# Patient Record
Sex: MALE | Race: WHITE | Employment: OTHER | URBAN - METROPOLITAN AREA
[De-identification: names, ages, dates, MRNs, and addresses within clinical notes are randomized per-mention and may not be internally consistent; named-entity substitution may affect disease eponyms.]

---

## 2023-09-02 ENCOUNTER — HOSPITAL ENCOUNTER (EMERGENCY)
Age: 68
Discharge: HOME | End: 2023-09-02
Payer: MEDICARE

## 2023-09-02 VITALS
TEMPERATURE: 98.2 F | OXYGEN SATURATION: 96 % | HEART RATE: 85 BPM | SYSTOLIC BLOOD PRESSURE: 132 MMHG | DIASTOLIC BLOOD PRESSURE: 88 MMHG | WEIGHT: 213 LBS | HEIGHT: 69 IN | BODY MASS INDEX: 31.55 KG/M2 | RESPIRATION RATE: 18 BRPM

## 2023-09-02 DIAGNOSIS — R05.1 ACUTE COUGH: Primary | ICD-10-CM

## 2023-09-02 PROCEDURE — 99211 OFF/OP EST MAY X REQ PHY/QHP: CPT

## 2023-09-02 RX ORDER — METHYLPREDNISOLONE 4 MG/1
TABLET ORAL
Qty: 1 KIT | Refills: 0 | Status: SHIPPED | OUTPATIENT
Start: 2023-09-02 | End: 2023-09-08

## 2023-09-02 RX ORDER — AMOXICILLIN 500 MG/1
500 CAPSULE ORAL 3 TIMES DAILY
Qty: 21 CAPSULE | Refills: 0 | Status: SHIPPED | OUTPATIENT
Start: 2023-09-02 | End: 2023-09-09

## 2023-09-02 RX ORDER — BROMPHENIRAMINE MALEATE, PSEUDOEPHEDRINE HYDROCHLORIDE, AND DEXTROMETHORPHAN HYDROBROMIDE 2; 30; 10 MG/5ML; MG/5ML; MG/5ML
5 SYRUP ORAL 4 TIMES DAILY PRN
Qty: 120 ML | Refills: 0 | Status: SHIPPED | OUTPATIENT
Start: 2023-09-02 | End: 2023-09-07

## 2023-09-02 ASSESSMENT — PAIN SCALES - GENERAL: PAINLEVEL_OUTOF10: 0

## 2023-09-02 ASSESSMENT — PAIN - FUNCTIONAL ASSESSMENT: PAIN_FUNCTIONAL_ASSESSMENT: 0-10

## 2023-10-15 ENCOUNTER — HOSPITAL ENCOUNTER (EMERGENCY)
Age: 68
Discharge: HOME | End: 2023-10-15
Attending: STUDENT IN AN ORGANIZED HEALTH CARE EDUCATION/TRAINING PROGRAM
Payer: MEDICARE

## 2023-10-15 ENCOUNTER — APPOINTMENT (OUTPATIENT)
Dept: GENERAL RADIOLOGY | Age: 68
End: 2023-10-15
Payer: MEDICARE

## 2023-10-15 VITALS
WEIGHT: 210 LBS | RESPIRATION RATE: 18 BRPM | BODY MASS INDEX: 31.01 KG/M2 | SYSTOLIC BLOOD PRESSURE: 140 MMHG | HEART RATE: 67 BPM | TEMPERATURE: 98.1 F | OXYGEN SATURATION: 96 % | DIASTOLIC BLOOD PRESSURE: 75 MMHG

## 2023-10-15 DIAGNOSIS — R10.84 GENERALIZED ABDOMINAL PAIN: ICD-10-CM

## 2023-10-15 DIAGNOSIS — B02.9 HERPES ZOSTER WITHOUT COMPLICATION: Primary | ICD-10-CM

## 2023-10-15 LAB
ALBUMIN SERPL-MCNC: 4.8 G/DL (ref 3.5–5.2)
ALP SERPL-CCNC: 64 U/L (ref 40–129)
ALT SERPL-CCNC: 18 U/L (ref 0–40)
ANION GAP SERPL CALCULATED.3IONS-SCNC: 11 MMOL/L (ref 7–16)
AST SERPL-CCNC: 16 U/L (ref 0–39)
BASOPHILS # BLD: 0.07 K/UL (ref 0–0.2)
BASOPHILS NFR BLD: 1 % (ref 0–2)
BILIRUB SERPL-MCNC: 1 MG/DL (ref 0–1.2)
BUN SERPL-MCNC: 19 MG/DL (ref 6–23)
CALCIUM SERPL-MCNC: 9.9 MG/DL (ref 8.6–10.2)
CHLORIDE SERPL-SCNC: 102 MMOL/L (ref 98–107)
CO2 SERPL-SCNC: 26 MMOL/L (ref 22–29)
CREAT SERPL-MCNC: 1.1 MG/DL (ref 0.7–1.2)
EOSINOPHIL # BLD: 0.24 K/UL (ref 0.05–0.5)
EOSINOPHILS RELATIVE PERCENT: 3 % (ref 0–6)
ERYTHROCYTE [DISTWIDTH] IN BLOOD BY AUTOMATED COUNT: 13.2 % (ref 11.5–15)
GFR SERPL CREATININE-BSD FRML MDRD: >60 ML/MIN/1.73M2
GLUCOSE SERPL-MCNC: 138 MG/DL (ref 74–99)
HCT VFR BLD AUTO: 47.3 % (ref 37–54)
HGB BLD-MCNC: 15.5 G/DL (ref 12.5–16.5)
IMM GRANULOCYTES # BLD AUTO: 0.05 K/UL (ref 0–0.58)
IMM GRANULOCYTES NFR BLD: 1 % (ref 0–5)
LACTATE BLDV-SCNC: 1.4 MMOL/L (ref 0.5–2.2)
LACTATE BLDV-SCNC: 2.4 MMOL/L (ref 0.5–2.2)
LIPASE SERPL-CCNC: 39 U/L (ref 13–60)
LYMPHOCYTES NFR BLD: 2.11 K/UL (ref 1.5–4)
LYMPHOCYTES RELATIVE PERCENT: 28 % (ref 20–42)
MCH RBC QN AUTO: 28.4 PG (ref 26–35)
MCHC RBC AUTO-ENTMCNC: 32.8 G/DL (ref 32–34.5)
MCV RBC AUTO: 86.6 FL (ref 80–99.9)
MONOCYTES NFR BLD: 0.66 K/UL (ref 0.1–0.95)
MONOCYTES NFR BLD: 9 % (ref 2–12)
NEUTROPHILS NFR BLD: 59 % (ref 43–80)
NEUTS SEG NFR BLD: 4.47 K/UL (ref 1.8–7.3)
PLATELET # BLD AUTO: 195 K/UL (ref 130–450)
PMV BLD AUTO: 10.1 FL (ref 7–12)
POTASSIUM SERPL-SCNC: 4.8 MMOL/L (ref 3.5–5)
PROT SERPL-MCNC: 7.9 G/DL (ref 6.4–8.3)
RBC # BLD AUTO: 5.46 M/UL (ref 3.8–5.8)
SODIUM SERPL-SCNC: 139 MMOL/L (ref 132–146)
WBC OTHER # BLD: 7.6 K/UL (ref 4.5–11.5)

## 2023-10-15 PROCEDURE — 6360000004 HC RX CONTRAST MEDICATION: Performed by: RADIOLOGY

## 2023-10-15 PROCEDURE — 96361 HYDRATE IV INFUSION ADD-ON: CPT

## 2023-10-15 PROCEDURE — 74177 CT ABD & PELVIS W/CONTRAST: CPT

## 2023-10-15 PROCEDURE — 96374 THER/PROPH/DIAG INJ IV PUSH: CPT

## 2023-10-15 PROCEDURE — 6360000002 HC RX W HCPCS: Performed by: STUDENT IN AN ORGANIZED HEALTH CARE EDUCATION/TRAINING PROGRAM

## 2023-10-15 PROCEDURE — 99285 EMERGENCY DEPT VISIT HI MDM: CPT

## 2023-10-15 PROCEDURE — 2580000003 HC RX 258: Performed by: STUDENT IN AN ORGANIZED HEALTH CARE EDUCATION/TRAINING PROGRAM

## 2023-10-15 PROCEDURE — 85025 COMPLETE CBC W/AUTO DIFF WBC: CPT

## 2023-10-15 PROCEDURE — 2500000003 HC RX 250 WO HCPCS: Performed by: STUDENT IN AN ORGANIZED HEALTH CARE EDUCATION/TRAINING PROGRAM

## 2023-10-15 PROCEDURE — 80053 COMPREHEN METABOLIC PANEL: CPT

## 2023-10-15 PROCEDURE — 71045 X-RAY EXAM CHEST 1 VIEW: CPT

## 2023-10-15 PROCEDURE — 83690 ASSAY OF LIPASE: CPT

## 2023-10-15 PROCEDURE — 83605 ASSAY OF LACTIC ACID: CPT

## 2023-10-15 PROCEDURE — 93005 ELECTROCARDIOGRAM TRACING: CPT | Performed by: STUDENT IN AN ORGANIZED HEALTH CARE EDUCATION/TRAINING PROGRAM

## 2023-10-15 PROCEDURE — 96375 TX/PRO/DX INJ NEW DRUG ADDON: CPT

## 2023-10-15 PROCEDURE — A4216 STERILE WATER/SALINE, 10 ML: HCPCS | Performed by: STUDENT IN AN ORGANIZED HEALTH CARE EDUCATION/TRAINING PROGRAM

## 2023-10-15 RX ORDER — MORPHINE SULFATE 2 MG/ML
2 INJECTION, SOLUTION INTRAMUSCULAR; INTRAVENOUS ONCE
Status: COMPLETED | OUTPATIENT
Start: 2023-10-15 | End: 2023-10-15

## 2023-10-15 RX ORDER — ONDANSETRON 2 MG/ML
4 INJECTION INTRAMUSCULAR; INTRAVENOUS ONCE
Status: COMPLETED | OUTPATIENT
Start: 2023-10-15 | End: 2023-10-15

## 2023-10-15 RX ORDER — ONDANSETRON 4 MG/1
4 TABLET, ORALLY DISINTEGRATING ORAL 3 TIMES DAILY PRN
Qty: 21 TABLET | Refills: 0 | Status: SHIPPED | OUTPATIENT
Start: 2023-10-15

## 2023-10-15 RX ORDER — VALACYCLOVIR HYDROCHLORIDE 1 G/1
1000 TABLET, FILM COATED ORAL 3 TIMES DAILY
Qty: 21 TABLET | Refills: 0 | Status: SHIPPED | OUTPATIENT
Start: 2023-10-15 | End: 2023-10-22

## 2023-10-15 RX ORDER — OXYCODONE HYDROCHLORIDE AND ACETAMINOPHEN 5; 325 MG/1; MG/1
1 TABLET ORAL EVERY 6 HOURS PRN
Qty: 12 TABLET | Refills: 0 | Status: SHIPPED | OUTPATIENT
Start: 2023-10-15 | End: 2023-10-18

## 2023-10-15 RX ORDER — PANTOPRAZOLE SODIUM 40 MG/1
40 TABLET, DELAYED RELEASE ORAL
Qty: 30 TABLET | Refills: 0 | Status: SHIPPED | OUTPATIENT
Start: 2023-10-15

## 2023-10-15 RX ORDER — ACETAMINOPHEN 325 MG/1
650 TABLET ORAL ONCE
Status: DISCONTINUED | OUTPATIENT
Start: 2023-10-15 | End: 2023-10-15 | Stop reason: HOSPADM

## 2023-10-15 RX ORDER — 0.9 % SODIUM CHLORIDE 0.9 %
1000 INTRAVENOUS SOLUTION INTRAVENOUS ONCE
Status: COMPLETED | OUTPATIENT
Start: 2023-10-15 | End: 2023-10-15

## 2023-10-15 RX ADMIN — IOPAMIDOL 75 ML: 755 INJECTION, SOLUTION INTRAVENOUS at 12:14

## 2023-10-15 RX ADMIN — MORPHINE SULFATE 2 MG: 2 INJECTION, SOLUTION INTRAMUSCULAR; INTRAVENOUS at 11:31

## 2023-10-15 RX ADMIN — FAMOTIDINE 20 MG: 10 INJECTION, SOLUTION INTRAVENOUS at 10:37

## 2023-10-15 RX ADMIN — SODIUM CHLORIDE 1000 ML: 9 INJECTION, SOLUTION INTRAVENOUS at 10:37

## 2023-10-15 RX ADMIN — ONDANSETRON 4 MG: 2 INJECTION INTRAMUSCULAR; INTRAVENOUS at 10:43

## 2023-10-15 ASSESSMENT — PAIN DESCRIPTION - FREQUENCY: FREQUENCY: CONTINUOUS

## 2023-10-15 ASSESSMENT — PAIN SCALES - GENERAL
PAINLEVEL_OUTOF10: 10
PAINLEVEL_OUTOF10: 8

## 2023-10-15 ASSESSMENT — LIFESTYLE VARIABLES
HOW OFTEN DO YOU HAVE A DRINK CONTAINING ALCOHOL: NEVER
HOW MANY STANDARD DRINKS CONTAINING ALCOHOL DO YOU HAVE ON A TYPICAL DAY: PATIENT DOES NOT DRINK

## 2023-10-15 ASSESSMENT — PAIN DESCRIPTION - PAIN TYPE: TYPE: ACUTE PAIN

## 2023-10-15 ASSESSMENT — PAIN DESCRIPTION - LOCATION
LOCATION: ABDOMEN
LOCATION: ABDOMEN

## 2023-10-15 ASSESSMENT — PAIN DESCRIPTION - ORIENTATION
ORIENTATION: LEFT;UPPER
ORIENTATION: LEFT;UPPER

## 2023-10-15 ASSESSMENT — PAIN DESCRIPTION - DESCRIPTORS: DESCRIPTORS: ACHING;DISCOMFORT;SORE;PRESSURE

## 2023-10-15 ASSESSMENT — PAIN DESCRIPTION - ONSET: ONSET: ON-GOING

## 2023-10-15 ASSESSMENT — PAIN - FUNCTIONAL ASSESSMENT: PAIN_FUNCTIONAL_ASSESSMENT: 0-10

## 2023-10-15 NOTE — ED PROVIDER NOTES
as well as left-sided chest wall pain. Vital signs stable presentation. Physical exam heart regular rate and rhythm lungs clear to auscultation bilaterally, abdomen soft with mild epigastric left upper quadrant abdominal tenderness. On examination left chest wall there is a early vesicular rash noted. Differential diagnosis includes gastritis, gastroenteritis, herpes zoster. EKG obtained demonstrate no acute ischemic changes. Laboratory work obtained CBC unremarkable, CMP unremarkable, lipase 39, lactic acid 1.4. Chest x-ray obtained demonstrate no acute abnormalities. CT scan of the abdomen pelvis demonstrated mild evidence of constipation as well as fat-containing inguinal hernias. Given 1 L of IV fluids, 2 mg of IV morphine, 4 mg of IV Zofran as well as 20 mg of IV Pepcid. Repeat evaluation patient does note some improvement in pain. Findings consistent with left-sided chest pain likely secondary to herpes zoster. Decision made to discharge patient. Patient given prescription for Zofran, Percocet, Protonix as well as Valtrex. Patient instructed to take all medication as prescribed. Strict return precautions discussed all questions answered patient agreement plan of care discharged home in stable condition. Disposition Considerations (Tests not ordered but considered, Shared Decision Making, Pt Expectation of Test or Tx.): Shared decision making discussed with patient. Laboratory work and imaging is reassuring. Findings concerning for herpes zoster infection. Patient does have some improvement in pain after treatment emergency department. Decision made to discharge patient. Patient given prescription for Valtrex as well as Percocet Zofran and Protonix. Strict return precautions discussed all questions answered patient agreement plan of care discharged home in stable condition. FINAL IMPRESSION      1. Herpes zoster without complication    2.  Generalized abdominal pain

## 2023-10-16 LAB
EKG ATRIAL RATE: 78 BPM
EKG P AXIS: 65 DEGREES
EKG P-R INTERVAL: 174 MS
EKG Q-T INTERVAL: 388 MS
EKG QRS DURATION: 112 MS
EKG QTC CALCULATION (BAZETT): 442 MS
EKG R AXIS: -8 DEGREES
EKG T AXIS: 18 DEGREES
EKG VENTRICULAR RATE: 78 BPM

## 2023-10-16 PROCEDURE — 93010 ELECTROCARDIOGRAM REPORT: CPT | Performed by: INTERNAL MEDICINE

## 2024-12-15 ENCOUNTER — HOSPITAL ENCOUNTER (EMERGENCY)
Facility: HOSPITAL | Age: 69
Discharge: HOME | End: 2024-12-15
Payer: MEDICARE

## 2024-12-15 VITALS
WEIGHT: 215 LBS | OXYGEN SATURATION: 96 % | BODY MASS INDEX: 31.84 KG/M2 | RESPIRATION RATE: 18 BRPM | HEART RATE: 92 BPM | SYSTOLIC BLOOD PRESSURE: 163 MMHG | HEIGHT: 69 IN | TEMPERATURE: 97.2 F | DIASTOLIC BLOOD PRESSURE: 95 MMHG

## 2024-12-15 DIAGNOSIS — M54.50 CHRONIC LOW BACK PAIN WITHOUT SCIATICA, UNSPECIFIED BACK PAIN LATERALITY: Primary | ICD-10-CM

## 2024-12-15 DIAGNOSIS — G89.29 CHRONIC LOW BACK PAIN WITHOUT SCIATICA, UNSPECIFIED BACK PAIN LATERALITY: Primary | ICD-10-CM

## 2024-12-15 PROCEDURE — 2500000004 HC RX 250 GENERAL PHARMACY W/ HCPCS (ALT 636 FOR OP/ED)

## 2024-12-15 PROCEDURE — 96372 THER/PROPH/DIAG INJ SC/IM: CPT

## 2024-12-15 PROCEDURE — 99284 EMERGENCY DEPT VISIT MOD MDM: CPT

## 2024-12-15 RX ORDER — TRAMADOL HYDROCHLORIDE 50 MG/1
50 TABLET ORAL EVERY 6 HOURS PRN
Qty: 10 TABLET | Refills: 0 | Status: SHIPPED | OUTPATIENT
Start: 2024-12-15 | End: 2024-12-18

## 2024-12-15 RX ORDER — OXYCODONE AND ACETAMINOPHEN 5; 325 MG/1; MG/1
1 TABLET ORAL 2 TIMES DAILY
Qty: 6 TABLET | Refills: 0 | Status: SHIPPED | OUTPATIENT
Start: 2024-12-15 | End: 2024-12-18

## 2024-12-15 RX ORDER — METHYLPREDNISOLONE 4 MG/1
TABLET ORAL
Qty: 21 TABLET | Refills: 0 | Status: SHIPPED | OUTPATIENT
Start: 2024-12-15 | End: 2024-12-21

## 2024-12-15 RX ADMIN — METHYLPREDNISOLONE SODIUM SUCCINATE 125 MG: 125 INJECTION, POWDER, FOR SOLUTION INTRAMUSCULAR; INTRAVENOUS at 09:45

## 2024-12-15 ASSESSMENT — PAIN DESCRIPTION - ORIENTATION: ORIENTATION: LOWER

## 2024-12-15 ASSESSMENT — PAIN DESCRIPTION - PAIN TYPE: TYPE: ACUTE PAIN

## 2024-12-15 ASSESSMENT — COLUMBIA-SUICIDE SEVERITY RATING SCALE - C-SSRS
6. HAVE YOU EVER DONE ANYTHING, STARTED TO DO ANYTHING, OR PREPARED TO DO ANYTHING TO END YOUR LIFE?: NO
1. IN THE PAST MONTH, HAVE YOU WISHED YOU WERE DEAD OR WISHED YOU COULD GO TO SLEEP AND NOT WAKE UP?: NO
2. HAVE YOU ACTUALLY HAD ANY THOUGHTS OF KILLING YOURSELF?: NO

## 2024-12-15 ASSESSMENT — PAIN - FUNCTIONAL ASSESSMENT: PAIN_FUNCTIONAL_ASSESSMENT: 0-10

## 2024-12-15 ASSESSMENT — PAIN SCALES - GENERAL: PAINLEVEL_OUTOF10: 8

## 2024-12-15 ASSESSMENT — PAIN DESCRIPTION - DESCRIPTORS: DESCRIPTORS: ACHING

## 2024-12-15 ASSESSMENT — PAIN DESCRIPTION - LOCATION: LOCATION: BACK

## 2024-12-15 NOTE — ED TRIAGE NOTES
Patient to ED c/o lower back pain rated 8/10. Pt states no known injury, and that this usually occurs once or twice a year. Patient is ambulatory to department. vss

## 2024-12-15 NOTE — ED PROVIDER NOTES
HPI   Chief Complaint   Patient presents with    Back Pain     Lower back pain, chronic pt states happens about 1-2 times per year       Patient presents with chief complaint of low back pain.  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  Patient just moved here approximately 3 weeks ago and does not have a primary care physician.  The patient does admit to a chronic issue of low back pain.    ROS:    CONSTITUTIONAL: Denies weight loss, fever and chills    HEENT: Denies changes in vision and hearing, No sore throat    RESPIRATORY: Denies SOB and cough    CV: Denies palpitations or chest pain    GI: Denies abdominal pain, nausea, vomiting and diarrhea    : Denies dysuria and urinary frequency    MUSCULOSKELETAL: Low back pain    SKIN: Denies rash and pruritus    NEUROLOGICAL: Denies headache and syncope    PSYCHIATRIC: Denies recent changes in mood. Denies anxiety and depression      PHYSICAL EXAM:    GENERAL: Alert and oriented x 3. No acute distress. Well-nourished    EYES: EOMI. Anicteric    HEENT: Moist mucous membranes. No scleral icterus. No cervical lymphadenopathy    LUNGS: Clear to auscultation bilaterally. No accessory muscle use    CARDIOVASCULAR: Regular rate and rhythm. No murmur. No JVD    ABDOMEN: Soft, non-tender and non-distended. No palpable masses    EXTREMITIES: No edema. Non-tender    SKIN: No rashes or lesions    NEUROLOGIC: No focal neurological deficits. CN II-XII grossly intact    PSYCHIATRIC: Cooperative. Appropriate mood and affect      Medical Decision Making:    Differential Diagnosis: Chronic low back pain    Clinical Laboratory Review:    Imaging Review:    Discussion with Consulting Physician:    Treatment Plan: Patient to receive an injection of Solu-Medrol and discharged to home with medication    Follow Up:  Follow up with your primary care physician as soon as possible    Return Precautions:  Return to the emergency department if symptoms  worsen at any time              Patient History   History reviewed. No pertinent past medical history.  History reviewed. No pertinent surgical history.  No family history on file.  Social History     Tobacco Use    Smoking status: Unknown    Smokeless tobacco: Not on file   Substance Use Topics    Alcohol use: Not on file    Drug use: Not on file       Physical Exam   ED Triage Vitals [12/15/24 0916]   Temperature Heart Rate Respirations BP   36.2 °C (97.2 °F) 92 18 (!) 163/95      SpO2 Temp Source Heart Rate Source Patient Position   96 % Temporal Monitor --      BP Location FiO2 (%)     -- --       Physical Exam      ED Course & MDM   Diagnoses as of 12/15/24 0941   Chronic low back pain without sciatica, unspecified back pain laterality                 No data recorded                                 Medical Decision Making      Procedure  Procedures    Diagnoses as of 12/15/24 0943   Chronic low back pain without sciatica, unspecified back pain laterality          Shae Davalos,   12/15/24 0943

## 2025-01-04 ENCOUNTER — HOSPITAL ENCOUNTER (EMERGENCY)
Facility: HOSPITAL | Age: 70
Discharge: HOME | End: 2025-01-04
Attending: EMERGENCY MEDICINE
Payer: MEDICARE

## 2025-01-04 ENCOUNTER — APPOINTMENT (OUTPATIENT)
Dept: RADIOLOGY | Facility: HOSPITAL | Age: 70
End: 2025-01-04
Payer: MEDICARE

## 2025-01-04 VITALS
HEART RATE: 81 BPM | DIASTOLIC BLOOD PRESSURE: 78 MMHG | OXYGEN SATURATION: 97 % | HEIGHT: 69 IN | TEMPERATURE: 98.2 F | RESPIRATION RATE: 19 BRPM | SYSTOLIC BLOOD PRESSURE: 132 MMHG | BODY MASS INDEX: 29.62 KG/M2 | WEIGHT: 200 LBS

## 2025-01-04 DIAGNOSIS — G89.29 CHRONIC LOW BACK PAIN WITHOUT SCIATICA, UNSPECIFIED BACK PAIN LATERALITY: Primary | ICD-10-CM

## 2025-01-04 DIAGNOSIS — M54.50 CHRONIC LOW BACK PAIN WITHOUT SCIATICA, UNSPECIFIED BACK PAIN LATERALITY: Primary | ICD-10-CM

## 2025-01-04 PROCEDURE — 72072 X-RAY EXAM THORAC SPINE 3VWS: CPT

## 2025-01-04 PROCEDURE — 2500000004 HC RX 250 GENERAL PHARMACY W/ HCPCS (ALT 636 FOR OP/ED)

## 2025-01-04 PROCEDURE — 72110 X-RAY EXAM L-2 SPINE 4/>VWS: CPT | Performed by: RADIOLOGY

## 2025-01-04 PROCEDURE — 99284 EMERGENCY DEPT VISIT MOD MDM: CPT | Performed by: EMERGENCY MEDICINE

## 2025-01-04 PROCEDURE — 72072 X-RAY EXAM THORAC SPINE 3VWS: CPT | Performed by: RADIOLOGY

## 2025-01-04 PROCEDURE — 96372 THER/PROPH/DIAG INJ SC/IM: CPT

## 2025-01-04 PROCEDURE — 72110 X-RAY EXAM L-2 SPINE 4/>VWS: CPT

## 2025-01-04 RX ORDER — MORPHINE SULFATE 2 MG/ML
INJECTION, SOLUTION INTRAMUSCULAR; INTRAVENOUS
Status: COMPLETED
Start: 2025-01-04 | End: 2025-01-04

## 2025-01-04 RX ORDER — HYDROCODONE BITARTRATE AND ACETAMINOPHEN 5; 325 MG/1; MG/1
1 TABLET ORAL EVERY 6 HOURS PRN
Qty: 20 TABLET | Refills: 0 | Status: SHIPPED | OUTPATIENT
Start: 2025-01-04 | End: 2025-01-07

## 2025-01-04 RX ORDER — SEMAGLUTIDE 0.68 MG/ML
0.5 INJECTION, SOLUTION SUBCUTANEOUS WEEKLY
COMMUNITY
Start: 2024-12-31

## 2025-01-04 RX ORDER — MORPHINE SULFATE 2 MG/ML
2 INJECTION, SOLUTION INTRAMUSCULAR; INTRAVENOUS ONCE
Status: COMPLETED | OUTPATIENT
Start: 2025-01-04 | End: 2025-01-04

## 2025-01-04 RX ORDER — LOSARTAN POTASSIUM 100 MG/1
100 TABLET ORAL
COMMUNITY
Start: 2024-12-12

## 2025-01-04 RX ORDER — TRAMADOL HYDROCHLORIDE 50 MG/1
50 TABLET ORAL EVERY 6 HOURS PRN
Qty: 10 TABLET | Refills: 0 | Status: SHIPPED | OUTPATIENT
Start: 2025-01-04 | End: 2025-01-07

## 2025-01-04 RX ADMIN — MORPHINE SULFATE 2 MG: 2 INJECTION, SOLUTION INTRAMUSCULAR; INTRAVENOUS at 13:05

## 2025-01-04 ASSESSMENT — COLUMBIA-SUICIDE SEVERITY RATING SCALE - C-SSRS
2. HAVE YOU ACTUALLY HAD ANY THOUGHTS OF KILLING YOURSELF?: NO
6. HAVE YOU EVER DONE ANYTHING, STARTED TO DO ANYTHING, OR PREPARED TO DO ANYTHING TO END YOUR LIFE?: NO
1. IN THE PAST MONTH, HAVE YOU WISHED YOU WERE DEAD OR WISHED YOU COULD GO TO SLEEP AND NOT WAKE UP?: NO

## 2025-01-04 ASSESSMENT — PAIN SCALES - GENERAL
PAINLEVEL_OUTOF10: 6
PAINLEVEL_OUTOF10: 3

## 2025-01-04 ASSESSMENT — PAIN - FUNCTIONAL ASSESSMENT
PAIN_FUNCTIONAL_ASSESSMENT: 0-10
PAIN_FUNCTIONAL_ASSESSMENT: 0-10

## 2025-01-04 NOTE — ED TRIAGE NOTES
Pt arrived with back pain in middle of back. States was there a month ago now has returned. Denies nay other symptoms

## 2025-01-04 NOTE — ED PROVIDER NOTES
HPI   Chief Complaint   Patient presents with    Back Pain     Middle of back, was there a month ago       Patient presents with a chief complaint of mid to lower back pain.  Patient does have a history of chronic back pain.  He denies any recent trauma.  He was seen several weeks ago in the emergency department for back pain.  He states he just moved here and does not have a family physician.    ROS:    CONSTITUTIONAL: Denies weight loss, fever and chills    HEENT: Denies changes in vision and hearing, No sore throat    RESPIRATORY: Denies SOB and cough    CV: Denies palpitations or chest pain    GI: Denies abdominal pain, nausea, vomiting and diarrhea    : Denies dysuria and urinary frequency    MUSCULOSKELETAL: Mid to lower back pain    SKIN: Denies rash and pruritus    NEUROLOGICAL: Denies headache and syncope    PSYCHIATRIC: Denies recent changes in mood. Denies anxiety and depression      PHYSICAL EXAM:    GENERAL: Alert and oriented x 3. No acute distress. Well-nourished    EYES: EOMI. Anicteric    HEENT: Moist mucous membranes. No scleral icterus. No cervical lymphadenopathy    LUNGS: Clear to auscultation bilaterally. No accessory muscle use    CARDIOVASCULAR: Regular rate and rhythm. No murmur. No JVD    ABDOMEN: Soft, non-tender and non-distended. No palpable masses    EXTREMITIES: No edema. Non-tender    SKIN: No rashes or lesions    NEUROLOGIC: No focal neurological deficits. CN II-XII grossly intact    PSYCHIATRIC: Cooperative. Appropriate mood and affect        Medical Decision Making:     Differential Diagnosis: Arthritis, compression fracture    Clinical Laboratory Review:    Imaging Review: Reviewed x-rays which reveal mild degenerative disease    Discussion with Consulting Physician:    Treatment Plan: Discussed with patient diagnosis of arthritis.  Patient needs to find a primary care physician as soon as possible    Follow Up:  Follow up with your primary care physician as soon as  possible    Return Precautions:  Return to the emergency department if symptoms worsen at any time                      Patient History   Past Medical History:   Diagnosis Date    Diabetes mellitus (Multi)     Hypertension      Past Surgical History:   Procedure Laterality Date    HERNIA REPAIR      SHOULDER Left      No family history on file.  Social History     Tobacco Use    Smoking status: Former     Types: Cigarettes    Smokeless tobacco: Never   Substance Use Topics    Alcohol use: Not Currently    Drug use: Never       Physical Exam   ED Triage Vitals [01/04/25 1250]   Temperature Heart Rate Respirations BP   36.5 °C (97.7 °F) 85 18 133/90      SpO2 Temp Source Heart Rate Source Patient Position   97 % Tympanic -- --      BP Location FiO2 (%)     -- --       Physical Exam      ED Course & MDM   Diagnoses as of 01/04/25 1350   Chronic low back pain without sciatica, unspecified back pain laterality                 No data recorded     [place] Coma Scale Score: 15 (01/04/25 1243 : Shirley Dominguez RN)                           Medical Decision Making      Procedure  Procedures    Diagnoses as of 01/04/25 1351   Chronic low back pain without sciatica, unspecified back pain laterality          Shae Davalos DO  01/04/25 1351

## 2025-03-16 ENCOUNTER — HOSPITAL ENCOUNTER (EMERGENCY)
Facility: HOSPITAL | Age: 70
Discharge: HOME | End: 2025-03-16
Attending: EMERGENCY MEDICINE
Payer: MEDICARE

## 2025-03-16 ENCOUNTER — APPOINTMENT (OUTPATIENT)
Dept: RADIOLOGY | Facility: HOSPITAL | Age: 70
End: 2025-03-16
Payer: MEDICARE

## 2025-03-16 ENCOUNTER — APPOINTMENT (OUTPATIENT)
Dept: CARDIOLOGY | Facility: HOSPITAL | Age: 70
End: 2025-03-16
Payer: MEDICARE

## 2025-03-16 VITALS
DIASTOLIC BLOOD PRESSURE: 88 MMHG | SYSTOLIC BLOOD PRESSURE: 162 MMHG | BODY MASS INDEX: 29.62 KG/M2 | HEART RATE: 97 BPM | RESPIRATION RATE: 18 BRPM | WEIGHT: 200 LBS | OXYGEN SATURATION: 95 % | TEMPERATURE: 98.7 F | HEIGHT: 69 IN

## 2025-03-16 DIAGNOSIS — M54.12 CERVICAL RADICULOPATHY: Primary | ICD-10-CM

## 2025-03-16 DIAGNOSIS — M62.830 SPASM OF LEFT TRAPEZIUS MUSCLE: ICD-10-CM

## 2025-03-16 PROCEDURE — 93005 ELECTROCARDIOGRAM TRACING: CPT

## 2025-03-16 PROCEDURE — 2500000004 HC RX 250 GENERAL PHARMACY W/ HCPCS (ALT 636 FOR OP/ED): Performed by: EMERGENCY MEDICINE

## 2025-03-16 PROCEDURE — 70450 CT HEAD/BRAIN W/O DYE: CPT | Performed by: STUDENT IN AN ORGANIZED HEALTH CARE EDUCATION/TRAINING PROGRAM

## 2025-03-16 PROCEDURE — 72125 CT NECK SPINE W/O DYE: CPT | Performed by: STUDENT IN AN ORGANIZED HEALTH CARE EDUCATION/TRAINING PROGRAM

## 2025-03-16 PROCEDURE — 72125 CT NECK SPINE W/O DYE: CPT

## 2025-03-16 PROCEDURE — 96372 THER/PROPH/DIAG INJ SC/IM: CPT

## 2025-03-16 PROCEDURE — 2500000004 HC RX 250 GENERAL PHARMACY W/ HCPCS (ALT 636 FOR OP/ED)

## 2025-03-16 PROCEDURE — 70450 CT HEAD/BRAIN W/O DYE: CPT

## 2025-03-16 PROCEDURE — 96372 THER/PROPH/DIAG INJ SC/IM: CPT | Performed by: EMERGENCY MEDICINE

## 2025-03-16 PROCEDURE — 99285 EMERGENCY DEPT VISIT HI MDM: CPT | Mod: 25 | Performed by: EMERGENCY MEDICINE

## 2025-03-16 PROCEDURE — 73030 X-RAY EXAM OF SHOULDER: CPT | Mod: LT

## 2025-03-16 PROCEDURE — 73030 X-RAY EXAM OF SHOULDER: CPT | Mod: LEFT SIDE | Performed by: RADIOLOGY

## 2025-03-16 RX ORDER — KETOROLAC TROMETHAMINE 30 MG/ML
30 INJECTION, SOLUTION INTRAMUSCULAR; INTRAVENOUS ONCE
Status: DISCONTINUED | OUTPATIENT
Start: 2025-03-16 | End: 2025-03-16 | Stop reason: HOSPADM

## 2025-03-16 RX ORDER — MORPHINE SULFATE 4 MG/ML
4 INJECTION INTRAVENOUS ONCE
Status: DISCONTINUED | OUTPATIENT
Start: 2025-03-16 | End: 2025-03-16

## 2025-03-16 RX ORDER — MORPHINE SULFATE 4 MG/ML
4 INJECTION INTRAVENOUS ONCE
Status: COMPLETED | OUTPATIENT
Start: 2025-03-16 | End: 2025-03-16

## 2025-03-16 RX ORDER — MORPHINE SULFATE 4 MG/ML
INJECTION INTRAVENOUS
Status: COMPLETED
Start: 2025-03-16 | End: 2025-03-16

## 2025-03-16 RX ORDER — TIZANIDINE HYDROCHLORIDE 4 MG/1
4 CAPSULE, GELATIN COATED ORAL 4 TIMES DAILY PRN
Qty: 20 CAPSULE | Refills: 0 | Status: SHIPPED | OUTPATIENT
Start: 2025-03-16 | End: 2025-03-25 | Stop reason: HOSPADM

## 2025-03-16 RX ORDER — PREDNISONE 20 MG/1
40 TABLET ORAL DAILY
Qty: 10 TABLET | Refills: 0 | Status: SHIPPED | OUTPATIENT
Start: 2025-03-16 | End: 2025-03-25 | Stop reason: HOSPADM

## 2025-03-16 RX ORDER — OXYCODONE AND ACETAMINOPHEN 5; 325 MG/1; MG/1
1 TABLET ORAL EVERY 8 HOURS PRN
Qty: 9 TABLET | Refills: 0 | Status: SHIPPED | OUTPATIENT
Start: 2025-03-16 | End: 2025-03-25 | Stop reason: HOSPADM

## 2025-03-16 RX ADMIN — MORPHINE SULFATE 4 MG: 4 INJECTION, SOLUTION INTRAMUSCULAR; INTRAVENOUS at 13:54

## 2025-03-16 RX ADMIN — MORPHINE SULFATE 4 MG: 4 INJECTION INTRAVENOUS at 13:54

## 2025-03-16 ASSESSMENT — PAIN - FUNCTIONAL ASSESSMENT
PAIN_FUNCTIONAL_ASSESSMENT: 0-10
PAIN_FUNCTIONAL_ASSESSMENT: 0-10

## 2025-03-16 ASSESSMENT — PAIN DESCRIPTION - ORIENTATION: ORIENTATION: LEFT

## 2025-03-16 ASSESSMENT — PAIN SCALES - GENERAL
PAINLEVEL_OUTOF10: 3
PAINLEVEL_OUTOF10: 8

## 2025-03-16 ASSESSMENT — PAIN DESCRIPTION - DESCRIPTORS: DESCRIPTORS: ACHING

## 2025-03-16 ASSESSMENT — PAIN DESCRIPTION - LOCATION: LOCATION: SHOULDER

## 2025-03-16 NOTE — ED PROVIDER NOTES
History/Exam limitations: none.   Additional history was obtained from patient and EMS personnel.    HPI:       Mello Mathur Jr. is a 69 y.o. with a history of cochlear implant, speech impediment presents with difficulty forming thoughts, right arm paresthesia, and headache.  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  After 5 minutes of speaking as he was reading from a paper, he couldn't form his thoughts, had trouble reading the words, developed numbness of his right arm, and developed a mild diffuse headache.    He has had 3 other similar episodes in the past month.    No blood thinners.  Glucose normal for EMS       Last Known Well Time: 11:00 AM        ------------------------------------------------------------------------------------------------------------------------------------------     Physical Exam:    ED Triage Vitals [03/16/25 1301]   Temperature Heart Rate Respirations BP   37.5 °C (99.5 °F) (!) 104 16 (!) 180/93      SpO2 Temp src Heart Rate Source Patient Position   94 % -- -- --      BP Location FiO2 (%)     -- --          Gen: Not in acute distress  Head/Neck: NCAT  Eyes: Anicteric sclerae, noninjected conjunctivae  Nose: No rhinorrhea  Mouth:  MMM  Heart: Regular rate and rhythm w/ no murmurs, rubs, gallops  Lungs: CTAB w/o wheezes, rales, rhonchi, no increased work of breathing  Abdomen: Soft, NT/ND  Musculoskeletal: No deformities  Extremities: No edema.  Neurologic: Please see below for NIHSS  Skin: No rashes noted  Psychological: Calm        Interval: {nihss interval:90245}  Time: 1:21 PM  Person Administering Scale: Jefe Frances DO     1a  Level of consciousness: {consciousness neuro:39933}   1b. LOC questions:  {loc commands neuro:42626}   1c. LOC commands: {loc commands neuro:46044}   2.  Best Gaze: {best gaze neuro:39214}   3. Visual: {visual neuro:91371}   4. Facial Palsy: {facial palsy:81415}   5a. Motor left arm: {motor neuro:07455}    5b.  Motor right arm: {motor neuro:31108}   6a. motor left leg: {motor neuro:78496}   6b  Motor right leg:  {motor neuro:61299}   7. Limb Ataxia: {limb ataxia neuro:02017}   8.  Sensory: {sensory neuro:54240}   9. Best Language:  {best language neuro:51744}   10. Dysarthria: {dysarthria neuro:01654}   11. Extinction and Inattention: {extinction neuro:72365}     Total:   {0-42:45984}         VAN: {VAN:71212}     ------------------------------------------------------------------------------------------------------------------------------------------     Medical Decision Making:     Diagnoses as of 03/16/25 1321   Disorder of form of thought   Arm paresthesia, right   Acute nonintractable headache, unspecified headache type        EKG interpreted by myself: ***     Independent Interpretation of Studies: I independently interpreted the CT head and see {Stroke CT Independent Interpretation:74996}    Chronic Medical Conditions Significantly Affecting Care: ***    Social Determinants of Health Significantly Affecting Care: {Social determinants:23212}     External Records Reviewed: I reviewed recent and relevant outside records including: ***     Discussion of Management with Other Providers:   I discussed the patient/results with: *** neurology and the admitting team      IV Thrombolysis IV Thrombolysis Checklist    {Pediatric patients aged <18 years with Acute Suspected Stroke - refer to the [facility] Clinical Practice Guideline for the Emergency Management of pediatric patients with Acute Suspected Stroke & the Pediatric IV Thrombolysis Consent :99}    {IV Thrombolysis Given:41832}  {For EVT therapy, type .EVT :99}      Procedure  Procedures

## 2025-03-16 NOTE — ED PROVIDER NOTES
Emergency Department Provider Note        History of Present Illness     History provided by: Patient  Limitations to History: None  External Records Reviewed with Brief Summary: None    HPI:  Mello Mathur Jr. is a 69 y.o. male presents with left neck pain that radiates down to left scapula and left trapezius.  He had no associated injury.  The pain is severe and constant.  He denies shortness of breath arm or leg weakness bowel or bladder incontinence urinary retention or any other associated symptoms.  There was no traumatic injury.        Physical Exam   Triage vitals:  T 37.5 °C (99.5 °F)  HR (!) 104  BP (!) 180/93  RR 16  O2 94 %      General: Awake, alert, in no acute distress  Eyes: Gaze conjugate.  No scleral icterus or injection  HENT: Normo-cephalic, atraumatic. No stridor  CV: Tachycardic rate, regular rhythm. Radial pulses 2+ bilaterally  Resp: Breathing non-labored, speaking in full sentences.  Clear to auscultation bilaterally  GI: Soft, non-distended, non-tender. No rebound or guarding.    MSK/Extremities: No gross bony deformities. Moving all extremities.  Tenderness to the posterior cervical spine inclusive of the paraspinal and midline areas without step-offs or deformities.  Tenderness to the left trapezius and scapular area.  Tenderness to the left shoulder that ends at the proximal humerus.  Full active and passive range of motion of both upper extremities although there is some increased pain with lifting of the left arm.  Skin: Warm. Appropriate color  Neuro: Alert. Oriented. Face symmetric. Speech is fluent.  Gross strength and sensation intact in b/l UE and LEs  Psych: Appropriate mood and affect    Medical Decision Making & ED Course   Medical Decision Makin y.o. male presents with left neck pain into the left shoulder.  There was no direct traumatic injuries to the area.  X-ray left shoulder preliminary read by myself negative for fractures or dislocations.  This was confirmed  by radiology.  CT head shows no acute process.  Of note on CT cervical spine there is moderate left-sided neuroforaminal narrowing at C4-C5 and bilateral severe neuroforaminal narrowing at C5-C6 there is likely explains his symptoms.  He was given IM morphine with pain improvement.  OARRS reviewed and no suspicious prescribing activity noted.  He was given prescriptions for prednisone, tizanidine, Percocet.  Referred to spine medicine/pain management for follow-up.  Advised to return at anytime with increasing or worsening pain or any other concerns.  Patient agreeable with plan and verbalized understanding.  Discharged home.    CT head wo IV contrast   Final Result   No acute intracranial hemorrhage or mass effect.        Moderate left-sided neural foraminal narrowing at C4-C5 and severe   bilateral neural foraminal narrowing at C5-C6.        MACRO   None        Signed by: Christian Dominguez 3/16/2025 2:47 PM   Dictation workstation:   [id number]      CT cervical spine wo IV contrast   Final Result   No acute intracranial hemorrhage or mass effect.        Moderate left-sided neural foraminal narrowing at C4-C5 and severe   bilateral neural foraminal narrowing at C5-C6.        MACRO   None        Signed by: Christian Dominguez 3/16/2025 2:47 PM   Dictation workstation:   [id number]      XR shoulder left 2+ views   Final Result   1.  No acute fracture or dislocation.   2.  Calcific changes overlying the soft tissues of the humeral head   which may represent changes from a calcific tendinitis..   Signed by Evans Curiel MD          ----        ED Course:  ED Course as of 03/18/25 0901   Sun Mar 16, 2025   1705 ECG 12 lead  EKG interpreted by me shows sinus tachycardia with incomplete right bundle branch block.  Rate = 104.  Normal axis.  Nonspecific ST-T changes.  No acute injury pattern. [BT]      ED Course User Index  [BT] Jefe Frances DO         Diagnoses as of 03/18/25 0901   Cervical radiculopathy   Spasm of  left trapezius muscle     Disposition   As a result of the work-up, the patient was discharged home.  he was informed of his diagnosis and instructed to come back with any concerns or worsening of condition.  he and was agreeable to the plan as discussed above.  he was given the opportunity to ask questions.  All of the patient's questions were answered.    Procedures   Procedures        Jefe Frances DO  Emergency Medicine     Jefe Frances DO  03/18/25 0901

## 2025-03-18 LAB
ATRIAL RATE: 104 BPM
P AXIS: 54 DEGREES
P OFFSET: 202 MS
P ONSET: 126 MS
PR INTERVAL: 202 MS
Q ONSET: 227 MS
QRS COUNT: 17 BEATS
QRS DURATION: 110 MS
QT INTERVAL: 348 MS
QTC CALCULATION(BAZETT): 457 MS
QTC FREDERICIA: 418 MS
R AXIS: 33 DEGREES
T AXIS: 42 DEGREES
T OFFSET: 401 MS
VENTRICULAR RATE: 104 BPM

## 2025-03-24 ENCOUNTER — APPOINTMENT (OUTPATIENT)
Dept: RADIOLOGY | Facility: HOSPITAL | Age: 70
End: 2025-03-24
Payer: COMMERCIAL

## 2025-03-24 ENCOUNTER — HOSPITAL ENCOUNTER (OUTPATIENT)
Facility: HOSPITAL | Age: 70
Discharge: HOME | End: 2025-03-25
Attending: EMERGENCY MEDICINE | Admitting: HOSPITALIST
Payer: COMMERCIAL

## 2025-03-24 DIAGNOSIS — F10.929 ALCOHOLIC INTOXICATION WITH COMPLICATION: ICD-10-CM

## 2025-03-24 DIAGNOSIS — I95.0 IDIOPATHIC HYPOTENSION: ICD-10-CM

## 2025-03-24 DIAGNOSIS — I95.9 HYPOTENSION: Primary | ICD-10-CM

## 2025-03-24 LAB
ABO GROUP (TYPE) IN BLOOD: NORMAL
ALBUMIN SERPL BCP-MCNC: 3.8 G/DL (ref 3.4–5)
ALP SERPL-CCNC: 36 U/L (ref 33–136)
ALT SERPL W P-5'-P-CCNC: 23 U/L (ref 10–52)
ANION GAP BLDV CALCULATED.4IONS-SCNC: 9 MMOL/L (ref 10–25)
ANION GAP SERPL CALC-SCNC: 12 MMOL/L (ref 10–20)
ANTIBODY SCREEN: NORMAL
APAP SERPL-MCNC: <10 UG/ML
AST SERPL W P-5'-P-CCNC: 25 U/L (ref 9–39)
BASE EXCESS BLDV CALC-SCNC: -1.6 MMOL/L (ref -2–3)
BASOPHILS # BLD AUTO: 0.09 X10*3/UL (ref 0–0.1)
BASOPHILS NFR BLD AUTO: 1.2 %
BILIRUB SERPL-MCNC: 0.5 MG/DL (ref 0–1.2)
BODY TEMPERATURE: ABNORMAL
BUN SERPL-MCNC: 22 MG/DL (ref 6–23)
CA-I BLDV-SCNC: 1.1 MMOL/L (ref 1.1–1.33)
CALCIUM SERPL-MCNC: 8.1 MG/DL (ref 8.6–10.3)
CARDIAC TROPONIN I PNL SERPL HS: 7 NG/L (ref 0–20)
CHLORIDE BLDV-SCNC: 105 MMOL/L (ref 98–107)
CHLORIDE SERPL-SCNC: 105 MMOL/L (ref 98–107)
CO2 SERPL-SCNC: 22 MMOL/L (ref 21–32)
CREAT SERPL-MCNC: 1.49 MG/DL (ref 0.5–1.3)
EGFRCR SERPLBLD CKD-EPI 2021: 50 ML/MIN/1.73M*2
EOSINOPHIL # BLD AUTO: 0.29 X10*3/UL (ref 0–0.7)
EOSINOPHIL NFR BLD AUTO: 3.7 %
ERYTHROCYTE [DISTWIDTH] IN BLOOD BY AUTOMATED COUNT: 13.3 % (ref 11.5–14.5)
ETHANOL SERPL-MCNC: 149 MG/DL
GLUCOSE BLDV-MCNC: 164 MG/DL (ref 74–99)
GLUCOSE SERPL-MCNC: 176 MG/DL (ref 74–99)
HCO3 BLDV-SCNC: 23.7 MMOL/L (ref 22–26)
HCT VFR BLD AUTO: 38.2 % (ref 41–52)
HCT VFR BLD EST: 47 % (ref 41–52)
HGB BLD-MCNC: 12.5 G/DL (ref 13.5–17.5)
HGB BLDV-MCNC: 15.5 G/DL (ref 13.5–17.5)
IMM GRANULOCYTES # BLD AUTO: 0.05 X10*3/UL (ref 0–0.7)
IMM GRANULOCYTES NFR BLD AUTO: 0.6 % (ref 0–0.9)
INHALED O2 CONCENTRATION: 28 %
INR PPP: 1.1 (ref 0.9–1.1)
LACTATE BLDV-SCNC: 2.6 MMOL/L (ref 0.4–2)
LACTATE SERPL-SCNC: 2 MMOL/L (ref 0.4–2)
LACTATE SERPL-SCNC: 2.2 MMOL/L (ref 0.4–2)
LIPASE SERPL-CCNC: 238 U/L (ref 9–82)
LYMPHOCYTES # BLD AUTO: 2.43 X10*3/UL (ref 1.2–4.8)
LYMPHOCYTES NFR BLD AUTO: 31.1 %
MAGNESIUM SERPL-MCNC: 1.79 MG/DL (ref 1.6–2.4)
MCH RBC QN AUTO: 31.2 PG (ref 26–34)
MCHC RBC AUTO-ENTMCNC: 32.7 G/DL (ref 32–36)
MCV RBC AUTO: 95 FL (ref 80–100)
MONOCYTES # BLD AUTO: 0.74 X10*3/UL (ref 0.1–1)
MONOCYTES NFR BLD AUTO: 9.5 %
NEUTROPHILS # BLD AUTO: 4.22 X10*3/UL (ref 1.2–7.7)
NEUTROPHILS NFR BLD AUTO: 53.9 %
NRBC BLD-RTO: 0 /100 WBCS (ref 0–0)
OXYHGB MFR BLDV: 60.1 % (ref 45–75)
PCO2 BLDV: 41 MM HG (ref 41–51)
PH BLDV: 7.37 PH (ref 7.33–7.43)
PLATELET # BLD AUTO: 167 X10*3/UL (ref 150–450)
PO2 BLDV: 38 MM HG (ref 35–45)
POTASSIUM BLDV-SCNC: 3.9 MMOL/L (ref 3.5–5.3)
POTASSIUM SERPL-SCNC: 3.8 MMOL/L (ref 3.5–5.3)
PROT SERPL-MCNC: 6.4 G/DL (ref 6.4–8.2)
PROTHROMBIN TIME: 11.6 SECONDS (ref 9.8–12.4)
RBC # BLD AUTO: 4.01 X10*6/UL (ref 4.5–5.9)
RH FACTOR (ANTIGEN D): NORMAL
SALICYLATES SERPL-MCNC: <3 MG/DL
SAO2 % BLDV: 61 % (ref 45–75)
SODIUM BLDV-SCNC: 134 MMOL/L (ref 136–145)
SODIUM SERPL-SCNC: 135 MMOL/L (ref 136–145)
WBC # BLD AUTO: 7.8 X10*3/UL (ref 4.4–11.3)

## 2025-03-24 PROCEDURE — 2500000005 HC RX 250 GENERAL PHARMACY W/O HCPCS: Performed by: EMERGENCY MEDICINE

## 2025-03-24 PROCEDURE — 2550000001 HC RX 255 CONTRASTS: Performed by: EMERGENCY MEDICINE

## 2025-03-24 PROCEDURE — 70450 CT HEAD/BRAIN W/O DYE: CPT

## 2025-03-24 PROCEDURE — 83605 ASSAY OF LACTIC ACID: CPT | Performed by: EMERGENCY MEDICINE

## 2025-03-24 PROCEDURE — 86900 BLOOD TYPING SEROLOGIC ABO: CPT | Performed by: EMERGENCY MEDICINE

## 2025-03-24 PROCEDURE — 70450 CT HEAD/BRAIN W/O DYE: CPT | Performed by: STUDENT IN AN ORGANIZED HEALTH CARE EDUCATION/TRAINING PROGRAM

## 2025-03-24 PROCEDURE — 2500000004 HC RX 250 GENERAL PHARMACY W/ HCPCS (ALT 636 FOR OP/ED): Performed by: EMERGENCY MEDICINE

## 2025-03-24 PROCEDURE — 1100000001 HC PRIVATE ROOM DAILY: Mod: IPSPLIT

## 2025-03-24 PROCEDURE — 80179 DRUG ASSAY SALICYLATE: CPT | Performed by: EMERGENCY MEDICINE

## 2025-03-24 PROCEDURE — 96360 HYDRATION IV INFUSION INIT: CPT | Mod: 59

## 2025-03-24 PROCEDURE — 85610 PROTHROMBIN TIME: CPT | Performed by: EMERGENCY MEDICINE

## 2025-03-24 PROCEDURE — 36415 COLL VENOUS BLD VENIPUNCTURE: CPT | Performed by: EMERGENCY MEDICINE

## 2025-03-24 PROCEDURE — 82805 BLOOD GASES W/O2 SATURATION: CPT | Performed by: EMERGENCY MEDICINE

## 2025-03-24 PROCEDURE — 84484 ASSAY OF TROPONIN QUANT: CPT | Performed by: EMERGENCY MEDICINE

## 2025-03-24 PROCEDURE — 82077 ASSAY SPEC XCP UR&BREATH IA: CPT | Performed by: EMERGENCY MEDICINE

## 2025-03-24 PROCEDURE — 83690 ASSAY OF LIPASE: CPT | Performed by: EMERGENCY MEDICINE

## 2025-03-24 PROCEDURE — 74177 CT ABD & PELVIS W/CONTRAST: CPT | Performed by: STUDENT IN AN ORGANIZED HEALTH CARE EDUCATION/TRAINING PROGRAM

## 2025-03-24 PROCEDURE — 82435 ASSAY OF BLOOD CHLORIDE: CPT | Performed by: EMERGENCY MEDICINE

## 2025-03-24 PROCEDURE — 85025 COMPLETE CBC W/AUTO DIFF WBC: CPT | Performed by: EMERGENCY MEDICINE

## 2025-03-24 PROCEDURE — 99292 CRITICAL CARE ADDL 30 MIN: CPT | Performed by: EMERGENCY MEDICINE

## 2025-03-24 PROCEDURE — 72125 CT NECK SPINE W/O DYE: CPT | Performed by: STUDENT IN AN ORGANIZED HEALTH CARE EDUCATION/TRAINING PROGRAM

## 2025-03-24 PROCEDURE — 80143 DRUG ASSAY ACETAMINOPHEN: CPT | Performed by: EMERGENCY MEDICINE

## 2025-03-24 PROCEDURE — 72132 CT LUMBAR SPINE W/DYE: CPT | Performed by: STUDENT IN AN ORGANIZED HEALTH CARE EDUCATION/TRAINING PROGRAM

## 2025-03-24 PROCEDURE — 72125 CT NECK SPINE W/O DYE: CPT

## 2025-03-24 PROCEDURE — 74177 CT ABD & PELVIS W/CONTRAST: CPT

## 2025-03-24 PROCEDURE — 71260 CT THORAX DX C+: CPT | Performed by: STUDENT IN AN ORGANIZED HEALTH CARE EDUCATION/TRAINING PROGRAM

## 2025-03-24 PROCEDURE — 72129 CT CHEST SPINE W/DYE: CPT | Performed by: STUDENT IN AN ORGANIZED HEALTH CARE EDUCATION/TRAINING PROGRAM

## 2025-03-24 PROCEDURE — 99285 EMERGENCY DEPT VISIT HI MDM: CPT | Mod: 25

## 2025-03-24 PROCEDURE — 83735 ASSAY OF MAGNESIUM: CPT | Performed by: EMERGENCY MEDICINE

## 2025-03-24 RX ORDER — ACETAMINOPHEN 500 MG
10 TABLET ORAL DAILY PRN
Status: DISCONTINUED | OUTPATIENT
Start: 2025-03-24 | End: 2025-03-25 | Stop reason: HOSPADM

## 2025-03-24 RX ORDER — BISMUTH SUBSALICYLATE 262 MG
1 TABLET,CHEWABLE ORAL DAILY
Status: DISCONTINUED | OUTPATIENT
Start: 2025-03-25 | End: 2025-03-25 | Stop reason: HOSPADM

## 2025-03-24 RX ORDER — ONDANSETRON HYDROCHLORIDE 2 MG/ML
4 INJECTION, SOLUTION INTRAVENOUS EVERY 4 HOURS PRN
Status: DISCONTINUED | OUTPATIENT
Start: 2025-03-24 | End: 2025-03-25 | Stop reason: HOSPADM

## 2025-03-24 RX ORDER — LANOLIN ALCOHOL/MO/W.PET/CERES
100 CREAM (GRAM) TOPICAL DAILY
Status: DISCONTINUED | OUTPATIENT
Start: 2025-03-25 | End: 2025-03-25 | Stop reason: HOSPADM

## 2025-03-24 RX ORDER — ACETAMINOPHEN 325 MG/1
650 TABLET ORAL EVERY 6 HOURS PRN
Status: DISCONTINUED | OUTPATIENT
Start: 2025-03-24 | End: 2025-03-25 | Stop reason: HOSPADM

## 2025-03-24 RX ORDER — SODIUM CHLORIDE 9 MG/ML
100 INJECTION, SOLUTION INTRAVENOUS CONTINUOUS
Status: ACTIVE | OUTPATIENT
Start: 2025-03-24 | End: 2025-03-25

## 2025-03-24 RX ORDER — DIAZEPAM 5 MG/ML
10 INJECTION, SOLUTION INTRAMUSCULAR; INTRAVENOUS EVERY 2 HOUR PRN
Status: DISCONTINUED | OUTPATIENT
Start: 2025-03-24 | End: 2025-03-25 | Stop reason: HOSPADM

## 2025-03-24 RX ORDER — POLYETHYLENE GLYCOL 3350 17 G/17G
17 POWDER, FOR SOLUTION ORAL 2 TIMES DAILY PRN
Status: DISCONTINUED | OUTPATIENT
Start: 2025-03-24 | End: 2025-03-25 | Stop reason: HOSPADM

## 2025-03-24 RX ORDER — FOLIC ACID 1 MG/1
1 TABLET ORAL DAILY
Status: DISCONTINUED | OUTPATIENT
Start: 2025-03-25 | End: 2025-03-25 | Stop reason: HOSPADM

## 2025-03-24 RX ORDER — ALUMINUM HYDROXIDE, MAGNESIUM HYDROXIDE, AND SIMETHICONE 1200; 120; 1200 MG/30ML; MG/30ML; MG/30ML
20 SUSPENSION ORAL 4 TIMES DAILY PRN
Status: DISCONTINUED | OUTPATIENT
Start: 2025-03-24 | End: 2025-03-25 | Stop reason: HOSPADM

## 2025-03-24 RX ADMIN — Medication 2 L/MIN: at 19:18

## 2025-03-24 RX ADMIN — SODIUM CHLORIDE 2000 ML: 0.9 INJECTION, SOLUTION INTRAVENOUS at 19:17

## 2025-03-24 RX ADMIN — IOHEXOL 100 ML: 350 INJECTION, SOLUTION INTRAVENOUS at 20:05

## 2025-03-24 RX ADMIN — Medication 2 L/MIN: at 20:10

## 2025-03-24 ASSESSMENT — COLUMBIA-SUICIDE SEVERITY RATING SCALE - C-SSRS
2. HAVE YOU ACTUALLY HAD ANY THOUGHTS OF KILLING YOURSELF?: NO
1. IN THE PAST MONTH, HAVE YOU WISHED YOU WERE DEAD OR WISHED YOU COULD GO TO SLEEP AND NOT WAKE UP?: NO
6. HAVE YOU EVER DONE ANYTHING, STARTED TO DO ANYTHING, OR PREPARED TO DO ANYTHING TO END YOUR LIFE?: NO

## 2025-03-24 ASSESSMENT — PAIN SCALES - GENERAL: PAINLEVEL_OUTOF10: 0 - NO PAIN

## 2025-03-24 ASSESSMENT — PAIN - FUNCTIONAL ASSESSMENT: PAIN_FUNCTIONAL_ASSESSMENT: 0-10

## 2025-03-24 NOTE — ED TRIAGE NOTES
Patient to ED via EMS. Wife called as patient was unresponsive. Patient alert and oriented x 2 on arrival. Patient reports drinking vodka and taking muscle relaxer prescribed for back pain after a mechanical fall sustained last Friday.

## 2025-03-24 NOTE — ED PROVIDER NOTES
HPI   Chief Complaint   Patient presents with    Altered Mental Status       Chief complaint the patient presents with hypotension following a fall 1 week ago  History of present illness this patient apparently fell in the shower 1 week ago and states he injured his upper back area.  The patient today took doxepin Flexeril and drank one half of fifth of vodka and became unresponsive.  The wife notified 911.  Upon arrival the patient was totally unresponsive and had a blood pressure of 62/40.  Paramedics initiated IV fluid bolus of 300 cc.  And route the patient's mental status improved and now the patient is talking although not a good historian.  The patient states he is not suicidal and was having severe pain in his upper back status post fall in the bathroom 1 week ago.  He denies any headache or neck pain but the patient is disoriented to situation and date.  Upon arrival the patient has very low blood pressure of 69/44.  Second IV line was initiated immediately upon arrival rapid examination performed by me and trauma alert called.    physical exam:    General: Vitals noted, no distress. Afebrile. Alert and oriented  x 3.  Pupils equal and reactive bilaterally head exam no tenderness no hematomas.  Cervical spine nontender.      EENT: TMs clear. Posterior oropharynx unremarkable. No meningismus. No LAD.  No signs of trauma to the face.    Cardiac: Regular, rate, rhythm, no murmurs rubs or gallops.  Patient has a large hematoma to the right lateral thoracic area no crepitus.    Pulmonary: Lungs clear bilaterally with good aeration. No adventitious breath sounds. No wheezes rales or rhonchi.  No signs of respiratory distress.  No anterior chest tenderness.    Abdomen: Soft, nonsurgical.  The patient has tenderness to the right upper quadrant.  No peritoneal signs. Normoactive bowel sounds. No pulsatile masses.  No bruising to the abdomen noted.    Palpation of the spine no midline spinal tenderness to thoracic  spine no midline spinal tenderness to lumbar spine.    Extremities: No peripheral edema. Negative Homans bilaterally, no cords.    Skin: No rash. Intact.     Neuro: No focal neurologic deficits,  Cranial nerves normal as tested from II through XII.  Patient obviously somewhat confused.  Does not provide accurate answers at times                      Patient History   Past Medical History:   Diagnosis Date    Diabetes mellitus (Multi)     Hypertension      Past Surgical History:   Procedure Laterality Date    HERNIA REPAIR      SHOULDER Left      No family history on file.  Social History     Tobacco Use    Smoking status: Former     Types: Cigarettes    Smokeless tobacco: Never   Substance Use Topics    Alcohol use: Not Currently    Drug use: Never       Physical Exam   ED Triage Vitals [03/24/25 1909]   Temperature Heart Rate Respirations BP   37.5 °C (99.5 °F) 76 16 (!) 63/49      SpO2 Temp src Heart Rate Source Patient Position   90 % -- -- --      BP Location FiO2 (%)     -- --       Physical Exam      ED Course & [facility]   ED Course as of 03/26/25 0913   Mon Mar 24, 2025   1923 EKG interpreted by me.  Normal sinus rhythm, ventricular rate 76, poor R wave progression anteriorly, no acute ST changes, no signs of STEMI. [AG]   1931 Per wife's history patient and wife are in the process of moving from [place] to [place].  She has not seen the patient much in the past week because she has been in [place].  The patient was prescribed a muscle relaxant for some unspecified neck pain.  He does have a history of hypertension and diabetes.  The patient is not on anticoagulants.  The patient drinks regularly but not in excess.  Per wife [AG]   1933 EKG interpreted by me normal sinus rhythm, normals rate 76.  Axis normal.  No acute ST-T changes no signs of STEMI. [AG]   1933 I will be signing this patient over to the care of Dr. Love [AG]   1939 Discussed with wife at the bedside   BP improved 76/50 [AG]   1940 CBC  and Auto Differential(!)  CBC normal white count 7800 hemoglobin 12.5 [AG]   1940 Protime-INR [AG]      ED Course User Index  [AG] Mark Liriano MD         Diagnoses as of 03/26/25 0913   Idiopathic hypotension   Alcoholic intoxication with complication (CMS-HCC)   Hypotension                 No data recorded     [place] Coma Scale Score: 14 (03/24/25 1912 : Zena Day RN)                           Medical Decision Making      Procedure  Procedures     Mark Liriano MD  03/24/25 2006       Mark Liriano MD  03/24/25 2008       Mark Liriano MD  03/26/25 0913

## 2025-03-24 NOTE — ED PROCEDURE NOTE
Procedure  Critical Care    Performed by: Mark Liriano MD  Authorized by: Mark Liriano MD    Critical care provider statement:     Critical care time (minutes):  75    Critical care start time:  3/24/2025 7:41 PM    Critical care time was exclusive of:  Separately billable procedures and treating other patients and teaching time    Critical care was necessary to treat or prevent imminent or life-threatening deterioration of the following conditions:  Circulatory failure    Critical care was time spent personally by me on the following activities:  Blood draw for specimens, pulse oximetry, ordering and review of laboratory studies, ordering and review of radiographic studies, evaluation of patient's response to treatment, re-evaluation of patient's condition and examination of patient               Mark Liriano MD  03/24/25 1942

## 2025-03-25 VITALS
HEIGHT: 69 IN | WEIGHT: 200.62 LBS | TEMPERATURE: 97.9 F | SYSTOLIC BLOOD PRESSURE: 147 MMHG | HEART RATE: 80 BPM | RESPIRATION RATE: 17 BRPM | DIASTOLIC BLOOD PRESSURE: 91 MMHG | OXYGEN SATURATION: 95 % | BODY MASS INDEX: 29.71 KG/M2

## 2025-03-25 PROBLEM — F10.929 ALCOHOLIC INTOXICATION WITH COMPLICATION: Status: RESOLVED | Noted: 2025-03-24 | Resolved: 2025-03-25

## 2025-03-25 PROBLEM — I95.9 HYPOTENSION: Status: RESOLVED | Noted: 2025-03-24 | Resolved: 2025-03-25

## 2025-03-25 PROBLEM — I95.0 IDIOPATHIC HYPOTENSION: Status: RESOLVED | Noted: 2025-03-24 | Resolved: 2025-03-25

## 2025-03-25 LAB
ALBUMIN SERPL BCP-MCNC: 3.7 G/DL (ref 3.4–5)
ANION GAP SERPL CALC-SCNC: 11 MMOL/L (ref 10–20)
APPEARANCE UR: CLEAR
BILIRUB UR STRIP.AUTO-MCNC: NEGATIVE MG/DL
BUN SERPL-MCNC: 18 MG/DL (ref 6–23)
CALCIUM SERPL-MCNC: 7.6 MG/DL (ref 8.6–10.3)
CHLORIDE SERPL-SCNC: 106 MMOL/L (ref 98–107)
CO2 SERPL-SCNC: 21 MMOL/L (ref 21–32)
COLOR UR: YELLOW
CREAT SERPL-MCNC: 1.16 MG/DL (ref 0.5–1.3)
EGFRCR SERPLBLD CKD-EPI 2021: 68 ML/MIN/1.73M*2
ERYTHROCYTE [DISTWIDTH] IN BLOOD BY AUTOMATED COUNT: 13.2 % (ref 11.5–14.5)
ETHANOL SERPL-MCNC: <10 MG/DL
GLUCOSE SERPL-MCNC: 109 MG/DL (ref 74–99)
GLUCOSE UR STRIP.AUTO-MCNC: NEGATIVE MG/DL
HCT VFR BLD AUTO: 37.3 % (ref 41–52)
HGB BLD-MCNC: 12.2 G/DL (ref 13.5–17.5)
HOLD SPECIMEN: NORMAL
KETONES UR STRIP.AUTO-MCNC: NEGATIVE MG/DL
LEUKOCYTE ESTERASE UR QL STRIP.AUTO: NEGATIVE
MAGNESIUM SERPL-MCNC: 1.55 MG/DL (ref 1.6–2.4)
MCH RBC QN AUTO: 31 PG (ref 26–34)
MCHC RBC AUTO-ENTMCNC: 32.7 G/DL (ref 32–36)
MCV RBC AUTO: 95 FL (ref 80–100)
NITRITE UR QL STRIP.AUTO: NEGATIVE
NRBC BLD-RTO: 0 /100 WBCS (ref 0–0)
PH UR STRIP.AUTO: 5 [PH]
PHOSPHATE SERPL-MCNC: 2.4 MG/DL (ref 2.5–4.9)
PLATELET # BLD AUTO: 120 X10*3/UL (ref 150–450)
POTASSIUM SERPL-SCNC: 3.8 MMOL/L (ref 3.5–5.3)
PROT UR STRIP.AUTO-MCNC: NEGATIVE MG/DL
RBC # BLD AUTO: 3.93 X10*6/UL (ref 4.5–5.9)
RBC # UR STRIP.AUTO: NEGATIVE MG/DL
SODIUM SERPL-SCNC: 134 MMOL/L (ref 136–145)
SP GR UR STRIP.AUTO: 1.04
UROBILINOGEN UR STRIP.AUTO-MCNC: <2 MG/DL
WBC # BLD AUTO: 5.6 X10*3/UL (ref 4.4–11.3)

## 2025-03-25 PROCEDURE — 99234 HOSP IP/OBS SM DT SF/LOW 45: CPT | Performed by: NURSE PRACTITIONER

## 2025-03-25 PROCEDURE — 80069 RENAL FUNCTION PANEL: CPT | Mod: IPSPLIT | Performed by: HOSPITALIST

## 2025-03-25 PROCEDURE — 81003 URINALYSIS AUTO W/O SCOPE: CPT | Mod: IPSPLIT | Performed by: HOSPITALIST

## 2025-03-25 PROCEDURE — 2500000001 HC RX 250 WO HCPCS SELF ADMINISTERED DRUGS (ALT 637 FOR MEDICARE OP): Mod: IPSPLIT | Performed by: HOSPITALIST

## 2025-03-25 PROCEDURE — 36415 COLL VENOUS BLD VENIPUNCTURE: CPT | Mod: IPSPLIT | Performed by: HOSPITALIST

## 2025-03-25 PROCEDURE — 82077 ASSAY SPEC XCP UR&BREATH IA: CPT | Mod: IPSPLIT | Performed by: HOSPITALIST

## 2025-03-25 PROCEDURE — 2500000004 HC RX 250 GENERAL PHARMACY W/ HCPCS (ALT 636 FOR OP/ED): Mod: IPSPLIT

## 2025-03-25 PROCEDURE — 85027 COMPLETE CBC AUTOMATED: CPT | Mod: IPSPLIT | Performed by: HOSPITALIST

## 2025-03-25 PROCEDURE — 83735 ASSAY OF MAGNESIUM: CPT | Mod: IPSPLIT | Performed by: HOSPITALIST

## 2025-03-25 PROCEDURE — 94760 N-INVAS EAR/PLS OXIMETRY 1: CPT | Mod: IPSPLIT

## 2025-03-25 PROCEDURE — 2500000004 HC RX 250 GENERAL PHARMACY W/ HCPCS (ALT 636 FOR OP/ED): Mod: IPSPLIT | Performed by: NURSE PRACTITIONER

## 2025-03-25 RX ORDER — LOSARTAN POTASSIUM 50 MG/1
100 TABLET ORAL
Status: DISCONTINUED | OUTPATIENT
Start: 2025-03-25 | End: 2025-03-25 | Stop reason: HOSPADM

## 2025-03-25 RX ORDER — SODIUM CHLORIDE 9 MG/ML
INJECTION, SOLUTION INTRAVENOUS
Status: COMPLETED
Start: 2025-03-25 | End: 2025-03-25

## 2025-03-25 RX ORDER — HEPARIN SODIUM 5000 [USP'U]/ML
5000 INJECTION, SOLUTION INTRAVENOUS; SUBCUTANEOUS EVERY 12 HOURS
Status: DISCONTINUED | OUTPATIENT
Start: 2025-03-25 | End: 2025-03-25 | Stop reason: HOSPADM

## 2025-03-25 RX ORDER — LANOLIN ALCOHOL/MO/W.PET/CERES
400 CREAM (GRAM) TOPICAL DAILY
Status: DISCONTINUED | OUTPATIENT
Start: 2025-03-25 | End: 2025-03-25 | Stop reason: HOSPADM

## 2025-03-25 RX ORDER — LANOLIN ALCOHOL/MO/W.PET/CERES
100 CREAM (GRAM) TOPICAL DAILY
Qty: 30 TABLET | Refills: 11 | Status: SHIPPED | OUTPATIENT
Start: 2025-03-26

## 2025-03-25 RX ORDER — LANOLIN ALCOHOL/MO/W.PET/CERES
400 CREAM (GRAM) TOPICAL DAILY
Qty: 30 TABLET | Refills: 11 | Status: SHIPPED | OUTPATIENT
Start: 2025-03-26

## 2025-03-25 RX ORDER — FOLIC ACID 1 MG/1
1 TABLET ORAL DAILY
Qty: 30 TABLET | Refills: 11 | Status: SHIPPED | OUTPATIENT
Start: 2025-03-26

## 2025-03-25 RX ORDER — BISMUTH SUBSALICYLATE 262 MG
1 TABLET,CHEWABLE ORAL DAILY
Qty: 30 TABLET | Refills: 11 | Status: SHIPPED | OUTPATIENT
Start: 2025-03-26

## 2025-03-25 RX ADMIN — ACETAMINOPHEN 650 MG: 325 TABLET ORAL at 08:27

## 2025-03-25 RX ADMIN — SODIUM CHLORIDE 100 ML/HR: 9 INJECTION, SOLUTION INTRAVENOUS at 01:10

## 2025-03-25 RX ADMIN — ACETAMINOPHEN 650 MG: 325 TABLET ORAL at 01:12

## 2025-03-25 RX ADMIN — FOLIC ACID 1 MG: 1 TABLET ORAL at 08:27

## 2025-03-25 RX ADMIN — Medication 400 MG: at 09:22

## 2025-03-25 RX ADMIN — SODIUM CHLORIDE 100 ML/HR: 0.9 INJECTION, SOLUTION INTRAVENOUS at 01:10

## 2025-03-25 RX ADMIN — THIAMINE HCL TAB 100 MG 100 MG: 100 TAB at 08:27

## 2025-03-25 SDOH — SOCIAL STABILITY: SOCIAL INSECURITY: DOES ANYONE TRY TO KEEP YOU FROM HAVING/CONTACTING OTHER FRIENDS OR DOING THINGS OUTSIDE YOUR HOME?: NO

## 2025-03-25 SDOH — SOCIAL STABILITY: SOCIAL INSECURITY: HAS ANYONE EVER THREATENED TO HURT YOUR FAMILY OR YOUR PETS?: NO

## 2025-03-25 SDOH — SOCIAL STABILITY: SOCIAL INSECURITY: WITHIN THE LAST YEAR, HAVE YOU BEEN AFRAID OF YOUR PARTNER OR EX-PARTNER?: NO

## 2025-03-25 SDOH — SOCIAL STABILITY: SOCIAL INSECURITY: HAVE YOU HAD THOUGHTS OF HARMING ANYONE ELSE?: NO

## 2025-03-25 SDOH — SOCIAL STABILITY: SOCIAL INSECURITY: ABUSE: ADULT

## 2025-03-25 SDOH — SOCIAL STABILITY: SOCIAL INSECURITY: WITHIN THE LAST YEAR, HAVE YOU BEEN HUMILIATED OR EMOTIONALLY ABUSED IN OTHER WAYS BY YOUR PARTNER OR EX-PARTNER?: NO

## 2025-03-25 SDOH — SOCIAL STABILITY: SOCIAL INSECURITY: DO YOU FEEL UNSAFE GOING BACK TO THE PLACE WHERE YOU ARE LIVING?: NO

## 2025-03-25 SDOH — ECONOMIC STABILITY: INCOME INSECURITY: IN THE PAST 12 MONTHS HAS THE ELECTRIC, GAS, OIL, OR WATER COMPANY THREATENED TO SHUT OFF SERVICES IN YOUR HOME?: NO

## 2025-03-25 SDOH — ECONOMIC STABILITY: FOOD INSECURITY: WITHIN THE PAST 12 MONTHS, YOU WORRIED THAT YOUR FOOD WOULD RUN OUT BEFORE YOU GOT THE MONEY TO BUY MORE.: NEVER TRUE

## 2025-03-25 SDOH — SOCIAL STABILITY: SOCIAL INSECURITY
WITHIN THE LAST YEAR, HAVE YOU BEEN KICKED, HIT, SLAPPED, OR OTHERWISE PHYSICALLY HURT BY YOUR PARTNER OR EX-PARTNER?: NO

## 2025-03-25 SDOH — SOCIAL STABILITY: SOCIAL INSECURITY: HAVE YOU HAD ANY THOUGHTS OF HARMING ANYONE ELSE?: NO

## 2025-03-25 SDOH — SOCIAL STABILITY: SOCIAL INSECURITY: ARE THERE ANY APPARENT SIGNS OF INJURIES/BEHAVIORS THAT COULD BE RELATED TO ABUSE/NEGLECT?: NO

## 2025-03-25 SDOH — ECONOMIC STABILITY: HOUSING INSECURITY: IN THE LAST 12 MONTHS, WAS THERE A TIME WHEN YOU WERE NOT ABLE TO PAY THE MORTGAGE OR RENT ON TIME?: NO

## 2025-03-25 SDOH — SOCIAL STABILITY: SOCIAL INSECURITY: DO YOU FEEL ANYONE HAS EXPLOITED OR TAKEN ADVANTAGE OF YOU FINANCIALLY OR OF YOUR PERSONAL PROPERTY?: NO

## 2025-03-25 SDOH — SOCIAL STABILITY: SOCIAL INSECURITY
WITHIN THE LAST YEAR, HAVE YOU BEEN RAPED OR FORCED TO HAVE ANY KIND OF SEXUAL ACTIVITY BY YOUR PARTNER OR EX-PARTNER?: NO

## 2025-03-25 SDOH — ECONOMIC STABILITY: FOOD INSECURITY: WITHIN THE PAST 12 MONTHS, THE FOOD YOU BOUGHT JUST DIDN'T LAST AND YOU DIDN'T HAVE MONEY TO GET MORE.: NEVER TRUE

## 2025-03-25 SDOH — SOCIAL STABILITY: SOCIAL INSECURITY: ARE YOU OR HAVE YOU BEEN THREATENED OR ABUSED PHYSICALLY, EMOTIONALLY, OR SEXUALLY BY ANYONE?: NO

## 2025-03-25 SDOH — ECONOMIC STABILITY: FOOD INSECURITY: HOW HARD IS IT FOR YOU TO PAY FOR THE VERY BASICS LIKE FOOD, HOUSING, MEDICAL CARE, AND HEATING?: NOT HARD AT ALL

## 2025-03-25 ASSESSMENT — COGNITIVE AND FUNCTIONAL STATUS - GENERAL
PATIENT BASELINE BEDBOUND: NO
MOBILITY SCORE: 24
DAILY ACTIVITIY SCORE: 24

## 2025-03-25 ASSESSMENT — LIFESTYLE VARIABLES
HOW OFTEN DO YOU HAVE A DRINK CONTAINING ALCOHOL: 2-4 TIMES A MONTH
AUDITORY DISTURBANCES: NOT PRESENT
HEADACHE, FULLNESS IN HEAD: NOT PRESENT
PAROXYSMAL SWEATS: NO SWEAT VISIBLE
ORIENTATION AND CLOUDING OF SENSORIUM: ORIENTED AND CAN DO SERIAL ADDITIONS
SKIP TO QUESTIONS 9-10: 1
PAROXYSMAL SWEATS: NO SWEAT VISIBLE
ANXIETY: NO ANXIETY, AT EASE
TREMOR: NO TREMOR
HEADACHE, FULLNESS IN HEAD: NOT PRESENT
TREMOR: NO TREMOR
ANXIETY: NO ANXIETY, AT EASE
TREMOR: NO TREMOR
VISUAL DISTURBANCES: NOT PRESENT
TREMOR: NO TREMOR
AGITATION: NORMAL ACTIVITY
PAROXYSMAL SWEATS: NO SWEAT VISIBLE
HOW MANY STANDARD DRINKS CONTAINING ALCOHOL DO YOU HAVE ON A TYPICAL DAY: 1 OR 2
ANXIETY: NO ANXIETY, AT EASE
VISUAL DISTURBANCES: NOT PRESENT
NAUSEA AND VOMITING: NO NAUSEA AND NO VOMITING
AUDITORY DISTURBANCES: NOT PRESENT
NAUSEA AND VOMITING: NO NAUSEA AND NO VOMITING
AUDIT-C TOTAL SCORE: 2
TOTAL SCORE: 0
VISUAL DISTURBANCES: NOT PRESENT
HEADACHE, FULLNESS IN HEAD: NOT PRESENT
VISUAL DISTURBANCES: NOT PRESENT
NAUSEA AND VOMITING: NO NAUSEA AND NO VOMITING
AUDITORY DISTURBANCES: NOT PRESENT
AUDITORY DISTURBANCES: NOT PRESENT
PAROXYSMAL SWEATS: NO SWEAT VISIBLE
AGITATION: NORMAL ACTIVITY
BLOOD PRESSURE: 113/70
AGITATION: NORMAL ACTIVITY
TOTAL SCORE: 0
NAUSEA AND VOMITING: NO NAUSEA AND NO VOMITING
AGITATION: NORMAL ACTIVITY
HEADACHE, FULLNESS IN HEAD: NOT PRESENT
AUDITORY DISTURBANCES: NOT PRESENT
ANXIETY: NO ANXIETY, AT EASE
PULSE: 73
ORIENTATION AND CLOUDING OF SENSORIUM: ORIENTED AND CAN DO SERIAL ADDITIONS
TOTAL SCORE: 0
TOTAL SCORE: 0
HOW OFTEN DO YOU HAVE 6 OR MORE DRINKS ON ONE OCCASION: NEVER
PAROXYSMAL SWEATS: NO SWEAT VISIBLE
NAUSEA AND VOMITING: NO NAUSEA AND NO VOMITING
AGITATION: NORMAL ACTIVITY
AUDIT-C TOTAL SCORE: 2
ANXIETY: NO ANXIETY, AT EASE
VISUAL DISTURBANCES: NOT PRESENT
AGITATION: NORMAL ACTIVITY
TOTAL SCORE: 0
VISUAL DISTURBANCES: NOT PRESENT
HEADACHE, FULLNESS IN HEAD: NOT PRESENT
PULSE: 75
TREMOR: NO TREMOR
ORIENTATION AND CLOUDING OF SENSORIUM: ORIENTED AND CAN DO SERIAL ADDITIONS
PAROXYSMAL SWEATS: NO SWEAT VISIBLE
ANXIETY: NO ANXIETY, AT EASE
NAUSEA AND VOMITING: NO NAUSEA AND NO VOMITING
ORIENTATION AND CLOUDING OF SENSORIUM: ORIENTED AND CAN DO SERIAL ADDITIONS
HEADACHE, FULLNESS IN HEAD: NOT PRESENT
ORIENTATION AND CLOUDING OF SENSORIUM: ORIENTED AND CAN DO SERIAL ADDITIONS
ORIENTATION AND CLOUDING OF SENSORIUM: ORIENTED AND CAN DO SERIAL ADDITIONS
TREMOR: NO TREMOR
TOTAL SCORE: 0
AUDITORY DISTURBANCES: NOT PRESENT

## 2025-03-25 ASSESSMENT — ACTIVITIES OF DAILY LIVING (ADL)
ADEQUATE_TO_COMPLETE_ADL: NO
TOILETING: INDEPENDENT
JUDGMENT_ADEQUATE_SAFELY_COMPLETE_DAILY_ACTIVITIES: NO
FEEDING YOURSELF: INDEPENDENT
BATHING: INDEPENDENT
LACK_OF_TRANSPORTATION: NO
GROOMING: INDEPENDENT
HEARING - LEFT EAR: FUNCTIONAL
HEARING - RIGHT EAR: FUNCTIONAL
WALKS IN HOME: INDEPENDENT
LACK_OF_TRANSPORTATION: NO
PATIENT'S MEMORY ADEQUATE TO SAFELY COMPLETE DAILY ACTIVITIES?: NO
DRESSING YOURSELF: INDEPENDENT

## 2025-03-25 ASSESSMENT — PAIN - FUNCTIONAL ASSESSMENT
PAIN_FUNCTIONAL_ASSESSMENT: 0-10

## 2025-03-25 ASSESSMENT — PAIN SCALES - GENERAL
PAINLEVEL_OUTOF10: 7
PAINLEVEL_OUTOF10: 6
PAINLEVEL_OUTOF10: 3

## 2025-03-25 ASSESSMENT — PAIN DESCRIPTION - LOCATION: LOCATION: BACK

## 2025-03-25 ASSESSMENT — PATIENT HEALTH QUESTIONNAIRE - PHQ9
SUM OF ALL RESPONSES TO PHQ9 QUESTIONS 1 & 2: 0
1. LITTLE INTEREST OR PLEASURE IN DOING THINGS: NOT AT ALL
2. FEELING DOWN, DEPRESSED OR HOPELESS: NOT AT ALL

## 2025-03-25 NOTE — ED PROVIDER NOTES
HPI   Chief Complaint   Patient presents with    Altered Mental Status       HPI        Patient History   Past Medical History:   Diagnosis Date    Diabetes mellitus (Multi)     Hypertension      Past Surgical History:   Procedure Laterality Date    HERNIA REPAIR      SHOULDER Left      No family history on file.  Social History     Tobacco Use    Smoking status: Former     Types: Cigarettes    Smokeless tobacco: Never   Substance Use Topics    Alcohol use: Not Currently    Drug use: Never       Physical Exam   ED Triage Vitals   Temp Heart Rate Resp BP   03/24/25 1909 03/24/25 1909 03/24/25 1909 03/24/25 1909   37.5 °C (99.5 °F) 76 16 (!) 63/49      SpO2 Temp Source Heart Rate Source Patient Position   03/24/25 1909 03/25/25 0005 03/25/25 0005 03/25/25 0005   90 % Temporal Monitor Lying      BP Location FiO2 (%)     03/25/25 0005 --     Left arm        Physical Exam      ED Course & MDM   ED Course as of 03/25/25 0010   Mon Mar 24, 2025   1923 EKG interpreted by me.  Normal sinus rhythm, ventricular rate 76, poor R wave progression anteriorly, no acute ST changes, no signs of STEMI. [AG]   1931 Per wife's history patient and wife are in the process of moving from [place] to [place].  She has not seen the patient much in the past week because she has been in [place].  The patient was prescribed a muscle relaxant for some unspecified neck pain.  He does have a history of hypertension and diabetes.  The patient is not on anticoagulants.  The patient drinks regularly but not in excess.  Per wife [AG]   1933 EKG interpreted by me normal sinus rhythm, normals rate 76.  Axis normal.  No acute ST-T changes no signs of STEMI. [AG]   1933 I will be signing this patient over to the care of Dr. Love [AG]   1939 Discussed with wife at the bedside   BP improved 76/50 [AG]   1940 CBC and Auto Differential(!)  CBC normal white count 7800 hemoglobin 12.5 [AG]   1940 Protime-INR [AG]      ED Course User Index  [AG] Mrak BECK  MD Deandra         Diagnoses as of 03/25/25 0010   Idiopathic hypotension   Alcoholic intoxication with complication (CMS-HCC)   Hypotension                 No data recorded     Lawrence Coma Scale Score: 14 (03/24/25 1912 : Zena Day RN)                           Medical Decision Making      Procedure  Procedures     Shayne Love MD  03/25/25 0010

## 2025-03-25 NOTE — DISCHARGE SUMMARY
Discharge Diagnosis  Hypotension    Issues Requiring Follow-Up  None    Discharge Meds     Medication List      START taking these medications     folic acid 1 mg tablet; Commonly known as: Folvite; Take 1 tablet (1 mg)   by mouth once daily.; Start taking on: March 26, 2025   magnesium oxide 400 mg (241.3 mg magnesium) tablet; Commonly known as:   Mag-Ox; Take 1 tablet (400 mg) by mouth once daily.; Start taking on:   March 26, 2025   multivitamin tablet; Take 1 tablet by mouth once daily.; Start taking   on: March 26, 2025   thiamine 100 mg tablet; Commonly known as: Vitamin B-1; Take 1 tablet   (100 mg) by mouth once daily.; Start taking on: March 26, 2025     CONTINUE taking these medications     losartan 100 mg tablet; Commonly known as: Cozaar   Ozempic 0.25 mg or 0.5 mg (2 mg/3 mL) pen injector; Generic drug:   semaglutide     STOP taking these medications     oxyCODONE-acetaminophen 5-325 mg tablet; Commonly known as: Percocet   predniSONE 20 mg tablet; Commonly known as: Deltasone   tiZANidine 4 mg capsule; Commonly known as: Zanaflex       Test Results Pending At Discharge  Pending Labs       Order Current Status    Extra Urine Gray Tube In process    Urinalysis with Reflex Culture and Microscopic In process            Hospital Course   Mello Mathur Jr. is a 69 y.o. male  with PMHx significant for hypertension who presented to [facility] due to wife was unable to wake him. The ED found him with ETOH 149 with hypotension. When aware, patient expressed that he really wanted some sleep so drank a shot of liquor and then after that took a pill~zanaflex~so he could sleep. He didn't realize it would cause such profound somnolence. He was prescribed some pain meds and muscle relaxer after suffering a fall and has a large bruise to his right flank area. He is alert and oriented this morning and recognizes the mistake of mixing alcohol with sedating medications. He will discharge to home.     Pertinent Physical  Exam At Time of Discharge  Constitutional:       Appearance: Normal appearance.   HENT:      Head: Atraumatic.      Nose: Nose normal.      Mouth/Throat:      Mouth: Mucous membranes are moist.   Eyes:      Pupils: Pupils are equal, round, and reactive to light.   Cardiovascular:      Rate and Rhythm: Normal rate and regular rhythm.      Pulses: Normal pulses.      Heart sounds: Normal heart sounds.   Pulmonary:      Effort: Pulmonary effort is normal.      Breath sounds: Normal breath sounds.   Abdominal:      General: Bowel sounds are normal.      Palpations: Abdomen is soft.   Musculoskeletal:         General: Normal range of motion.   Skin:     General: Skin is warm and dry.      Capillary Refill: Capillary refill takes less than 2 seconds.      Findings: Bruising present.   Neurological:      Mental Status: He is alert.   Psychiatric:         Mood and Affect: Mood normal.         Behavior: Behavior normal.     Outpatient Follow-Up  No future appointments.      Lilian Sarah, APRN-CNP

## 2025-03-25 NOTE — H&P
History of Present Illness  Mello Mathur Jr. is a 69 y.o. male  with PMHx significant for hypertension who presented to [facility] due to wife was unable to wake him. The ED found him with ETOH 149 with hypotension. When aware, patient expressed that he really wanted some sleep so drank a shot of liquor and then after that took a pill~zanaflex~so he could sleep. He didn't realize it would cause such profound somnolence. He was prescribed some pain meds and muscle relaxer after suffering a fall and has a large bruise to his right flank area. He is alert and oriented this morning and recognizes the mistake of mixing alcohol with sedating medications. He will discharge to home.     12 Point ROS negative unless noted in above HPI    ED Course:  Lab work-up:  -ETOH~level~149  -creatinine 1.49>1.16~after hydration  -phosphorous 2.4  -magnesium 1.55  Imaging:  -CT head neg  -CT thoracic/lumbar~negative    Intervention:  - IVF 2L  -losartan placed on hold. Will discharge with losartan to resume as BP is also improved.      Past Medical History  Past Medical History:   Diagnosis Date    Diabetes mellitus (Multi)     Hypertension        Surgical History  Past Surgical History:   Procedure Laterality Date    HERNIA REPAIR      SHOULDER Left         Social History  He reports that he has quit smoking. His smoking use included cigarettes. He has never used smokeless tobacco. He reports that he does not currently use alcohol. He reports that he does not use drugs.    Allergies  Patient has no known allergies.     Physical Exam  Constitutional:       Appearance: Normal appearance.   HENT:      Head: Atraumatic.      Nose: Nose normal.      Mouth/Throat:      Mouth: Mucous membranes are moist.   Eyes:      Pupils: Pupils are equal, round, and reactive to light.   Cardiovascular:      Rate and Rhythm: Normal rate and regular rhythm.      Pulses: Normal pulses.      Heart sounds: Normal heart sounds.   Pulmonary:      Effort:  "Pulmonary effort is normal.      Breath sounds: Normal breath sounds.   Abdominal:      General: Bowel sounds are normal.      Palpations: Abdomen is soft.   Musculoskeletal:         General: Normal range of motion.   Skin:     General: Skin is warm and dry.      Capillary Refill: Capillary refill takes less than 2 seconds.      Findings: Bruising present.   Neurological:      Mental Status: He is alert.   Psychiatric:         Mood and Affect: Mood normal.         Behavior: Behavior normal.          Last Recorded Vitals  Blood pressure (!) 147/91, pulse 80, temperature 36.6 °C (97.9 °F), temperature source Temporal, resp. rate 17, height 1.753 m (5' 9\"), weight 91 kg (200 lb 9.9 oz), SpO2 95%.    Relevant Results  Scheduled medications  folic acid, 1 mg, oral, Daily  heparin, 5,000 Units, subcutaneous, q12h  [Held by provider] losartan, 100 mg, oral, Daily  magnesium oxide, 400 mg, oral, Daily  multivitamin, 1 tablet, oral, Daily  thiamine, 100 mg, oral, Daily      Continuous medications  sodium chloride 0.9%, 100 mL/hr, Last Rate: 100 mL/hr (03/25/25 0521)      PRN medications  PRN medications: acetaminophen, alum-mag hydroxide-simeth, benzocaine-menthol, diazePAM, melatonin, ondansetron, polyethylene glycol     Results for orders placed or performed during the hospital encounter of 03/24/25 (from the past 24 hours)   CBC and Auto Differential   Result Value Ref Range    WBC 7.8 4.4 - 11.3 x10*3/uL    nRBC 0.0 0.0 - 0.0 /100 WBCs    RBC 4.01 (L) 4.50 - 5.90 x10*6/uL    Hemoglobin 12.5 (L) 13.5 - 17.5 g/dL    Hematocrit 38.2 (L) 41.0 - 52.0 %    MCV 95 80 - 100 fL    MCH 31.2 26.0 - 34.0 pg    MCHC 32.7 32.0 - 36.0 g/dL    RDW 13.3 11.5 - 14.5 %    Platelets 167 150 - 450 x10*3/uL    Neutrophils % 53.9 40.0 - 80.0 %    Immature Granulocytes %, Automated 0.6 0.0 - 0.9 %    Lymphocytes % 31.1 13.0 - 44.0 %    Monocytes % 9.5 2.0 - 10.0 %    Eosinophils % 3.7 0.0 - 6.0 %    Basophils % 1.2 0.0 - 2.0 %    Neutrophils " Absolute 4.22 1.20 - 7.70 x10*3/uL    Immature Granulocytes Absolute, Automated 0.05 0.00 - 0.70 x10*3/uL    Lymphocytes Absolute 2.43 1.20 - 4.80 x10*3/uL    Monocytes Absolute 0.74 0.10 - 1.00 x10*3/uL    Eosinophils Absolute 0.29 0.00 - 0.70 x10*3/uL    Basophils Absolute 0.09 0.00 - 0.10 x10*3/uL   Comprehensive metabolic panel   Result Value Ref Range    Glucose 176 (H) 74 - 99 mg/dL    Sodium 135 (L) 136 - 145 mmol/L    Potassium 3.8 3.5 - 5.3 mmol/L    Chloride 105 98 - 107 mmol/L    Bicarbonate 22 21 - 32 mmol/L    Anion Gap 12 10 - 20 mmol/L    Urea Nitrogen 22 6 - 23 mg/dL    Creatinine 1.49 (H) 0.50 - 1.30 mg/dL    eGFR 50 (L) >60 mL/min/1.73m*2    Calcium 8.1 (L) 8.6 - 10.3 mg/dL    Albumin 3.8 3.4 - 5.0 g/dL    Alkaline Phosphatase 36 33 - 136 U/L    Total Protein 6.4 6.4 - 8.2 g/dL    AST 25 9 - 39 U/L    Bilirubin, Total 0.5 0.0 - 1.2 mg/dL    ALT 23 10 - 52 U/L   Magnesium   Result Value Ref Range    Magnesium 1.79 1.60 - 2.40 mg/dL   Lipase   Result Value Ref Range    Lipase 238 (H) 9 - 82 U/L   Lactate   Result Value Ref Range    Lactate 2.2 (H) 0.4 - 2.0 mmol/L   Protime-INR   Result Value Ref Range    Protime 11.6 9.8 - 12.4 seconds    INR 1.1 0.9 - 1.1   Troponin I, High Sensitivity   Result Value Ref Range    Troponin I, High Sensitivity 7 0 - 20 ng/L   Blood Gas Venous Full Panel   Result Value Ref Range    POCT pH, Venous 7.37 7.33 - 7.43 pH    POCT pCO2, Venous 41 41 - 51 mm Hg    POCT pO2, Venous 38 35 - 45 mm Hg    POCT SO2, Venous 61 45 - 75 %    POCT Oxy Hemoglobin, Venous 60.1 45.0 - 75.0 %    POCT Hematocrit Calculated, Venous 47.0 41.0 - 52.0 %    POCT Sodium, Venous 134 (L) 136 - 145 mmol/L    POCT Potassium, Venous 3.9 3.5 - 5.3 mmol/L    POCT Chloride, Venous 105 98 - 107 mmol/L    POCT Ionized Calicum, Venous 1.10 1.10 - 1.33 mmol/L    POCT Glucose, Venous 164 (H) 74 - 99 mg/dL    POCT Lactate, Venous 2.6 (H) 0.4 - 2.0 mmol/L    POCT Base Excess, Venous -1.6 -2.0 - 3.0 mmol/L     POCT HCO3 Calculated, Venous 23.7 22.0 - 26.0 mmol/L    POCT Hemoglobin, Venous 15.5 13.5 - 17.5 g/dL    POCT Anion Gap, Venous 9.0 (L) 10.0 - 25.0 mmol/L    Patient Temperature      FiO2 28 %   Type and Screen   Result Value Ref Range    ABO TYPE O     Rh TYPE POS     ANTIBODY SCREEN NEG    Alcohol   Result Value Ref Range    Alcohol 149 (H) <=10 mg/dL   Acetaminophen level   Result Value Ref Range    Acetaminophen <10.0 10.0 - 30.0 ug/mL   Salicylate level   Result Value Ref Range    Salicylate  <3 4 - 20 mg/dL   Lactate   Result Value Ref Range    Lactate 2.0 0.4 - 2.0 mmol/L   CBC   Result Value Ref Range    WBC 5.6 4.4 - 11.3 x10*3/uL    nRBC 0.0 0.0 - 0.0 /100 WBCs    RBC 3.93 (L) 4.50 - 5.90 x10*6/uL    Hemoglobin 12.2 (L) 13.5 - 17.5 g/dL    Hematocrit 37.3 (L) 41.0 - 52.0 %    MCV 95 80 - 100 fL    MCH 31.0 26.0 - 34.0 pg    MCHC 32.7 32.0 - 36.0 g/dL    RDW 13.2 11.5 - 14.5 %    Platelets 120 (L) 150 - 450 x10*3/uL   Renal Function Panel   Result Value Ref Range    Glucose 109 (H) 74 - 99 mg/dL    Sodium 134 (L) 136 - 145 mmol/L    Potassium 3.8 3.5 - 5.3 mmol/L    Chloride 106 98 - 107 mmol/L    Bicarbonate 21 21 - 32 mmol/L    Anion Gap 11 10 - 20 mmol/L    Urea Nitrogen 18 6 - 23 mg/dL    Creatinine 1.16 0.50 - 1.30 mg/dL    eGFR 68 >60 mL/min/1.73m*2    Calcium 7.6 (L) 8.6 - 10.3 mg/dL    Phosphorus 2.4 (L) 2.5 - 4.9 mg/dL    Albumin 3.7 3.4 - 5.0 g/dL   Magnesium   Result Value Ref Range    Magnesium 1.55 (L) 1.60 - 2.40 mg/dL   Alcohol   Result Value Ref Range    Alcohol <10 <=10 mg/dL   Urinalysis with Reflex Culture and Microscopic   Result Value Ref Range    Color, Urine Yellow Straw, Yellow    Appearance, Urine Clear Clear    Specific Gravity, Urine 1.036 (N) 1.005 - 1.035    pH, Urine 5.0 5.0, 5.5, 6.0, 6.5, 7.0, 7.5, 8.0    Protein, Urine NEGATIVE NEGATIVE mg/dL    Glucose, Urine NEGATIVE NEGATIVE mg/dL    Blood, Urine NEGATIVE NEGATIVE mg/dL    Ketones, Urine NEGATIVE NEGATIVE mg/dL     Bilirubin, Urine NEGATIVE NEGATIVE mg/dL    Urobilinogen, Urine <2.0 <2.0 mg/dL    Nitrite, Urine NEGATIVE NEGATIVE    Leukocyte Esterase, Urine NEGATIVE NEGATIVE        Imaging  CT chest abdomen pelvis w IV contrast    Result Date: 3/24/2025  Interpreted By:  Deepthi Matthew, STUDY: CT CHEST ABDOMEN PELVIS W IV CONTRAST;  3/24/2025 8:12 pm   INDICATION: Signs/Symptoms:trauma a FEW DAYS AGO WITH HYPOTENSION AND CHEST ABDOM INJURY.     COMPARISON: None.   ACCESSION NUMBER(S): [id number]   ORDERING CLINICIAN: DEEPTHI ENRIQUEZ   TECHNIQUE: Contiguous axial images of the chest, abdomen, and pelvis were obtained after the intravenous administration of iodinated contrast. Coronal and sagittal reformatted images were reconstructed from the axial data.   FINDINGS:   CT CHEST:   MEDIASTINUM AND LYMPH NODES:  The esophageal wall appears within normal limits.  No enlarged intrathoracic or axillary lymph nodes by imaging criteria. No pneumomediastinum.   VESSELS:  Normal caliber thoracic aorta without dissection. No significant aortic atherosclerosis.   HEART: Normal size.  Severe coronary artery calcifications. No significant pericardial effusion.   LUNG, AIRWAYS, PLEURA:  No consolidation, pulmonary edema, pleural effusion or pneumothorax.   OSSEOUS STRUCTURES: No acute osseous abnormality.   CHEST WALL SOFT TISSUES: No discernible acute abnormality.     ABDOMEN/PELVIS:   ABDOMINAL WALL: No significant abnormality.   LIVER: Diffuse hypoattenuation of the liver consistent with steatosis. A 1.0 cm very low-density lesion segment 8 most likely represents a benign cyst.   BILE DUCTS: No significant intrahepatic or extrahepatic dilatation.   GALLBLADDER: No significant abnormality.   PANCREAS: No significant abnormality.   SPLEEN: No significant abnormality.   ADRENALS: No significant abnormality.   KIDNEYS, URETERS, BLADDER: Mildly atrophic kidneys. Simple 1.5 cm right renal cyst, simple 2.8 cm left renal cyst months others. No  hydroureteronephrosis or nephroureterolithiasis. The urinary bladder wall thickness appears within normal limits for degree of distention.   REPRODUCTIVE ORGANS: No significant abnormality.   VESSELS: No acute vascular injury. Mild aortic atherosclerosis without AAA. 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   RETROPERITONEUM/LYMPH NODES: No acute retroperitoneal abnormality. No enlarged lymph nodes.   BOWEL/PERITONEUM: There is colonic diverticulosis without evidence for diverticulitis.  No inflammatory bowel wall thickening or dilatation. Normal appendix.   No ascites, free air, or fluid collection.     MUSCULOSKELETAL: No acute osseous abnormality.  No suspicious osseous lesion.       No acute abnormality in the chest, abdomen, or pelvis.   Colonic diverticulosis.   Severe coronary calcifications.   MACRO: None.   Signed by: Deepthi Matthew 3/24/2025 8:42 PM Dictation workstation:   [id number]    CT head wo IV contrast    Result Date: 3/24/2025  Interpreted By:  Deepthi Matthew, STUDY: CT HEAD WO IV CONTRAST; CT LUMBAR SPINE RETROSPECTIVE RECONSTRUCTION PROTOCOL; CT THORACIC SPINE RECONSTRUCTIVE PROTOCOL; CT CERVICAL SPINE WO IV CONTRAST;  3/24/2025 8:08 pm; 3/24/2025 8:14 pm; 3/24/2025 8:15 pm; 3/24/2025 8:09 pm   INDICATION: Signs/Symptoms:FALL AND HEAR\D INJURY; Signs/Symptoms:fall and lumbar pain; Signs/Symptoms:UPPER BACK PAin; Signs/Symptoms:FALL AND NECK INJURY     COMPARISON: 03/16/2025 CT head and cervical spine.   ACCESSION NUMBER(S): [id number]; [id number]; [id number]; [id number]   ORDERING CLINICIAN: DEEPTHI ENRIQUEZ   TECHNIQUE: Axial noncontrast CT images of head with coronal and sagittal reconstructed images. Axial noncontrast CT images of the cervical spine with coronal and sagittal reconstructed images. Multiplanar reformatted images of the thoracic and lumbar spine were obtained from the CT CAP raw data with accession # [id number].    FINDINGS: CT HEAD:   BRAIN PARENCHYMA: Mild periventricular and subcortical hemispheric white matter hypodensities are most compatible with chronic small vessel ischemic disease. No acute intraparenchymal hemorrhage or parenchymal evidence of acute large territory ischemic infarct. Gray-white matter distinction is preserved. No mass-effect.   VENTRICLES and EXTRA-AXIAL SPACES:  No acute extra-axial or intraventricular hemorrhage. No effacement of cerebral sulci. The ventricles and sulci are age-concordant.   PARANASAL SINUSES/MASTOIDS: Diffuse mild mucosal thickening of the ethmoid sinuses. No hemorrhage or air-fluid levels within the visualized paranasal sinuses. The mastoids are well aerated.   CALVARIUM/ORBITS:  No skull fracture.  The orbits and globes are intact to the extent visualized.   EXTRACRANIAL SOFT TISSUES: No discernible acute abnormality.     CT CERVICAL SPINE:   PREVERTEBRAL SOFT TISSUES: Within normal limits.   CRANIOCERVICAL JUNCTION: Intact. 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   ALIGNMENT:  No traumatic malalignment or traumatic facet widening.   VERTEBRAE:  No acute fracture. Vertebral body heights are maintained.   SPINAL CANAL/INTERVERTEBRAL DISCS: Multilevel degenerative disc disease with disc spur complexes resulting in mild-moderate canal stenosis at C4-C5, C5-C6.   NEURAL FORAMINA: Multilevel uncovertebral joint and facet arthropathy notably contribute to moderate left C4-C5 foraminal stenosis, moderate bilateral C5-C6 foraminal stenosis.   OTHER: None.     CT THORACIC SPINE:   ALIGNMENT:  No traumatic spondylolisthesis or traumatic facet widening.   VERTEBRAE: Minimal smooth concave contour to the upper T3-T6 endplates compatible chronic attritional height loss remote compression injuries. No acute fracture.   SPINAL CANAL/INTERVERTEBRAL DISCS: No high-grade spinal canal  stenosis. No significant disc spur complexes. Mild anterior endplate spurring throughout the thoracic spine.   PARASPINAL SOFT TISSUES:  No paravertebral soft tissue hematoma.   VISUALIZED CHEST: Please see separate report.     CT LUMBAR SPINE:   ALIGNMENT:  No traumatic spondylolisthesis or traumatic facet widening.   VERTEBRAE:  No acute fracture. Vertebral body heights are maintained.   SPINAL CANAL/INTERVERTEBRAL DISCS: No high-grade spinal canal stenosis. Diffuse moderate disc height loss and vacuum phenomenon. There is mild broad-based disc bulging at L2-L3 without significant canal stenosis. There is a broad-based disc spur complex at L3-L4 resulting in mild canal stenosis. There is a broad-based disc bulge at L4-5 resulting in mild canal stenosis. There is a broad-based disc spur complex at L5-S1 not resulting in significant canal stenosis.   NEUROFORAMINA: No high-grade neural foraminal stenosis. There is mild bilateral L5-S1 neural foraminal stenosis. There is mild bilateral L3-L4 and L5-S1 facet arthropathy and moderate bilateral L4-L5 facet arthropathy.   PARASPINAL SOFT TISSUES:  No paravertebral soft tissue hematoma.   VISUALIZED ABDOMEN: Please see separate report.         CT HEAD: 1. No acute intracranial abnormality or calvarial fracture.       CT CERVICAL SPINE: 1. No acute fracture or traumatic malalignment of the cervical spine. 2. Spondylotic changes of the cervical spine as detailed above.   CT THORACIC/LUMBAR SPINE: 1. No acute fracture or traumatic malalignment of the thoracic or lumbar spine. 2. Minimal smooth concave contour to the upper T3-T6 endplates compatible chronic attritional height loss remote compression injuries. 3. No high-grade canal or foraminal stenosis in the thoracic or lumbar spine.   MACRO: None.   Signed by: Mark Matthew 3/24/2025 8:36 PM Dictation workstation:   [id number]    CT cervical spine wo IV contrast    Result Date: 3/24/2025  Interpreted By:  Tiff  Deepthi, STUDY: CT HEAD WO IV CONTRAST; CT LUMBAR SPINE RETROSPECTIVE RECONSTRUCTION PROTOCOL; CT THORACIC SPINE RECONSTRUCTIVE PROTOCOL; CT CERVICAL SPINE WO IV CONTRAST;  3/24/2025 8:08 pm; 3/24/2025 8:14 pm; 3/24/2025 8:15 pm; 3/24/2025 8:09 pm   INDICATION: Signs/Symptoms:FALL AND HEAR\D INJURY; Signs/Symptoms:fall and lumbar pain; Signs/Symptoms:UPPER BACK PAin; Signs/Symptoms:FALL AND NECK INJURY     COMPARISON: 03/16/2025 CT head and cervical spine.   ACCESSION NUMBER(S): [id number]; [id number]; [id number]; [id number]   ORDERING CLINICIAN: DEEPTHI ENRIQUEZ   TECHNIQUE: Axial noncontrast CT images of head with coronal and sagittal reconstructed images. Axial noncontrast CT images of the cervical spine with coronal and sagittal reconstructed images. Multiplanar reformatted images of the thoracic and lumbar spine were obtained from the CT CAP raw data with accession # [id number].   FINDINGS: CT HEAD:   BRAIN PARENCHYMA: Mild periventricular and subcortical hemispheric white matter hypodensities are most compatible with chronic small vessel ischemic disease. No acute intraparenchymal hemorrhage or parenchymal evidence of acute large territory ischemic infarct. Gray-white matter distinction is preserved. No mass-effect.   VENTRICLES and EXTRA-AXIAL SPACES:  No acute extra-axial or intraventricular hemorrhage. No effacement of cerebral sulci. The ventricles and sulci are age-concordant.   PARANASAL SINUSES/MASTOIDS: Diffuse mild mucosal thickening of the ethmoid sinuses. No hemorrhage or air-fluid levels within the visualized paranasal sinuses. The mastoids are well aerated.   CALVARIUM/ORBITS:  No skull fracture.  The orbits and globes are intact to the extent visualized.   EXTRACRANIAL SOFT TISSUES: No discernible acute abnormality.     CT CERVICAL SPINE:   PREVERTEBRAL SOFT TISSUES: Within normal limits.   CRANIOCERVICAL JUNCTION: Intact. Incidental note of a congenital osseous bridge extending from the  left C1 transverse process to the skull base through which the vertebral artery courses.   ALIGNMENT:  No traumatic malalignment or traumatic facet widening.   VERTEBRAE:  No acute fracture. Vertebral body heights are maintained.   SPINAL CANAL/INTERVERTEBRAL DISCS: Multilevel degenerative disc disease with disc spur complexes resulting in mild-moderate canal stenosis at C4-C5, C5-C6.   NEURAL FORAMINA: Multilevel uncovertebral joint and facet arthropathy notably contribute to moderate left C4-C5 foraminal stenosis, moderate bilateral C5-C6 foraminal stenosis.   OTHER: None.     CT THORACIC SPINE:   ALIGNMENT:  No traumatic spondylolisthesis or traumatic facet widening.   VERTEBRAE: Minimal smooth concave contour to the upper T3-T6 endplates compatible chronic attritional height loss remote compression injuries. No acute fracture.   SPINAL CANAL/INTERVERTEBRAL DISCS: No high-grade spinal canal stenosis. No significant disc spur complexes. Mild anterior endplate spurring throughout the thoracic spine.   PARASPINAL SOFT TISSUES:  No paravertebral soft tissue hematoma.   VISUALIZED CHEST: Please see separate report.     CT LUMBAR SPINE:   ALIGNMENT:  No traumatic spondylolisthesis or traumatic facet widening.   VERTEBRAE:  No acute fracture. Vertebral body heights are maintained.   SPINAL CANAL/INTERVERTEBRAL DISCS: No high-grade spinal canal stenosis. Diffuse moderate disc height loss and vacuum phenomenon. There is mild broad-based disc bulging at L2-L3 without significant canal stenosis. There is a broad-based disc spur complex at L3-L4 resulting in mild canal stenosis. There is a broad-based disc bulge at L4-5 resulting in mild canal stenosis. There is a broad-based disc spur complex at L5-S1 not resulting in significant canal stenosis.   NEUROFORAMINA: No high-grade neural foraminal stenosis. There is mild bilateral L5-S1 neural foraminal stenosis. There is mild bilateral L3-L4 and L5-S1 facet arthropathy and  moderate bilateral L4-L5 facet arthropathy.   PARASPINAL SOFT TISSUES:  No paravertebral soft tissue hematoma.   VISUALIZED ABDOMEN: Please see separate report.         CT HEAD: 1. No acute intracranial abnormality or calvarial fracture.       CT CERVICAL SPINE: 1. No acute fracture or traumatic malalignment of the cervical spine. 2. Spondylotic changes of the cervical spine as detailed above.   CT THORACIC/LUMBAR SPINE: 1. No acute fracture or traumatic malalignment of the thoracic or lumbar spine. 2. Minimal smooth concave contour to the upper T3-T6 endplates compatible chronic attritional height loss remote compression injuries. 3. No high-grade canal or foraminal stenosis in the thoracic or lumbar spine.   MACRO: None.   Signed by: Deepthi Matthew 3/24/2025 8:36 PM Dictation workstation:   [id number]    CT thoracic spine reconstructive protocol    Result Date: 3/24/2025  Interpreted By:  Deepthi Matthew, STUDY: CT HEAD WO IV CONTRAST; CT LUMBAR SPINE RETROSPECTIVE RECONSTRUCTION PROTOCOL; CT THORACIC SPINE RECONSTRUCTIVE PROTOCOL; CT CERVICAL SPINE WO IV CONTRAST;  3/24/2025 8:08 pm; 3/24/2025 8:14 pm; 3/24/2025 8:15 pm; 3/24/2025 8:09 pm   INDICATION: Signs/Symptoms:FALL AND HEAR\D INJURY; Signs/Symptoms:fall and lumbar pain; Signs/Symptoms:UPPER BACK PAin; Signs/Symptoms:FALL AND NECK INJURY     COMPARISON: 03/16/2025 CT head and cervical spine.   ACCESSION NUMBER(S): [id number]; [id number]; [id number]; [id number]   ORDERING CLINICIAN: DEEPTHI ENRIQUEZ   TECHNIQUE: Axial noncontrast CT images of head with coronal and sagittal reconstructed images. Axial noncontrast CT images of the cervical spine with coronal and sagittal reconstructed images. Multiplanar reformatted images of the thoracic and lumbar spine were obtained from the CT CAP raw data with accession # [id number].   FINDINGS: CT HEAD:   BRAIN PARENCHYMA: Mild periventricular and subcortical hemispheric white matter hypodensities are most  compatible with chronic small vessel ischemic disease. No acute intraparenchymal hemorrhage or parenchymal evidence of acute large territory ischemic infarct. Gray-white matter distinction is preserved. No mass-effect.   VENTRICLES and EXTRA-AXIAL SPACES:  No acute extra-axial or intraventricular hemorrhage. No effacement of cerebral sulci. The ventricles and sulci are age-concordant.   PARANASAL SINUSES/MASTOIDS: Diffuse mild mucosal thickening of the ethmoid sinuses. No hemorrhage or air-fluid levels within the visualized paranasal sinuses. The mastoids are well aerated.   CALVARIUM/ORBITS:  No skull fracture.  The orbits and globes are intact to the extent visualized.   EXTRACRANIAL SOFT TISSUES: No discernible acute abnormality.     CT CERVICAL SPINE:   PREVERTEBRAL SOFT TISSUES: Within normal limits.   CRANIOCERVICAL JUNCTION: Intact. 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   ALIGNMENT:  No traumatic malalignment or traumatic facet widening.   VERTEBRAE:  No acute fracture. Vertebral body heights are maintained.   SPINAL CANAL/INTERVERTEBRAL DISCS: Multilevel degenerative disc disease with disc spur complexes resulting in mild-moderate canal stenosis at C4-C5, C5-C6.   NEURAL FORAMINA: Multilevel uncovertebral joint and facet arthropathy notably contribute to moderate left C4-C5 foraminal stenosis, moderate bilateral C5-C6 foraminal stenosis.   OTHER: None.     CT THORACIC SPINE:   ALIGNMENT:  No traumatic spondylolisthesis or traumatic facet widening.   VERTEBRAE: Minimal smooth concave contour to the upper T3-T6 endplates compatible chronic attritional height loss remote compression injuries. No acute fracture.   SPINAL CANAL/INTERVERTEBRAL DISCS: No high-grade spinal canal stenosis. No significant disc spur complexes. Mild anterior endplate spurring throughout the thoracic spine.   PARASPINAL  SOFT TISSUES:  No paravertebral soft tissue hematoma.   VISUALIZED CHEST: Please see separate report.     CT LUMBAR SPINE:   ALIGNMENT:  No traumatic spondylolisthesis or traumatic facet widening.   VERTEBRAE:  No acute fracture. Vertebral body heights are maintained.   SPINAL CANAL/INTERVERTEBRAL DISCS: No high-grade spinal canal stenosis. Diffuse moderate disc height loss and vacuum phenomenon. There is mild broad-based disc bulging at L2-L3 without significant canal stenosis. There is a broad-based disc spur complex at L3-L4 resulting in mild canal stenosis. There is a broad-based disc bulge at L4-5 resulting in mild canal stenosis. There is a broad-based disc spur complex at L5-S1 not resulting in significant canal stenosis.   NEUROFORAMINA: No high-grade neural foraminal stenosis. There is mild bilateral L5-S1 neural foraminal stenosis. There is mild bilateral L3-L4 and L5-S1 facet arthropathy and moderate bilateral L4-L5 facet arthropathy.   PARASPINAL SOFT TISSUES:  No paravertebral soft tissue hematoma.   VISUALIZED ABDOMEN: Please see separate report.         CT HEAD: 1. No acute intracranial abnormality or calvarial fracture.       CT CERVICAL SPINE: 1. No acute fracture or traumatic malalignment of the cervical spine. 2. Spondylotic changes of the cervical spine as detailed above.   CT THORACIC/LUMBAR SPINE: 1. No acute fracture or traumatic malalignment of the thoracic or lumbar spine. 2. Minimal smooth concave contour to the upper T3-T6 endplates compatible chronic attritional height loss remote compression injuries. 3. No high-grade canal or foraminal stenosis in the thoracic or lumbar spine.   MACRO: None.   Signed by: Mark Matthew 3/24/2025 8:36 PM Dictation workstation:   [id number]    CT lumbar spine reconstructve protocol    Result Date: 3/24/2025  Interpreted By:  Mark Matthew, STUDY: CT HEAD WO IV CONTRAST; CT LUMBAR SPINE RETROSPECTIVE RECONSTRUCTION PROTOCOL; CT THORACIC SPINE  RECONSTRUCTIVE PROTOCOL; CT CERVICAL SPINE WO IV CONTRAST;  3/24/2025 8:08 pm; 3/24/2025 8:14 pm; 3/24/2025 8:15 pm; 3/24/2025 8:09 pm   INDICATION: Signs/Symptoms:FALL AND HEAR\D INJURY; Signs/Symptoms:fall and lumbar pain; Signs/Symptoms:UPPER BACK PAin; Signs/Symptoms:FALL AND NECK INJURY     COMPARISON: 03/16/2025 CT head and cervical spine.   ACCESSION NUMBER(S): [id number]; [id number]; [id number]; [id number]   ORDERING CLINICIAN: DEEPTHI ENRIQUEZ   TECHNIQUE: Axial noncontrast CT images of head with coronal and sagittal reconstructed images. Axial noncontrast CT images of the cervical spine with coronal and sagittal reconstructed images. Multiplanar reformatted images of the thoracic and lumbar spine were obtained from the CT CAP raw data with accession # [id number].   FINDINGS: CT HEAD:   BRAIN PARENCHYMA: Mild periventricular and subcortical hemispheric white matter hypodensities are most compatible with chronic small vessel ischemic disease. No acute intraparenchymal hemorrhage or parenchymal evidence of acute large territory ischemic infarct. Gray-white matter distinction is preserved. No mass-effect.   VENTRICLES and EXTRA-AXIAL SPACES:  No acute extra-axial or intraventricular hemorrhage. No effacement of cerebral sulci. The ventricles and sulci are age-concordant.   PARANASAL SINUSES/MASTOIDS: Diffuse mild mucosal thickening of the ethmoid sinuses. No hemorrhage or air-fluid levels within the visualized paranasal sinuses. The mastoids are well aerated.   CALVARIUM/ORBITS:  No skull fracture.  The orbits and globes are intact to the extent visualized.   EXTRACRANIAL SOFT TISSUES: No discernible acute abnormality.     CT CERVICAL SPINE:   PREVERTEBRAL SOFT TISSUES: Within normal limits.   CRANIOCERVICAL JUNCTION: Intact. 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   ALIGNMENT:  No  traumatic malalignment or traumatic facet widening.   VERTEBRAE:  No acute fracture. Vertebral body heights are maintained.   SPINAL CANAL/INTERVERTEBRAL DISCS: Multilevel degenerative disc disease with disc spur complexes resulting in mild-moderate canal stenosis at C4-C5, C5-C6.   NEURAL FORAMINA: Multilevel uncovertebral joint and facet arthropathy notably contribute to moderate left C4-C5 foraminal stenosis, moderate bilateral C5-C6 foraminal stenosis.   OTHER: None.     CT THORACIC SPINE:   ALIGNMENT:  No traumatic spondylolisthesis or traumatic facet widening.   VERTEBRAE: Minimal smooth concave contour to the upper T3-T6 endplates compatible chronic attritional height loss remote compression injuries. No acute fracture.   SPINAL CANAL/INTERVERTEBRAL DISCS: No high-grade spinal canal stenosis. No significant disc spur complexes. Mild anterior endplate spurring throughout the thoracic spine.   PARASPINAL SOFT TISSUES:  No paravertebral soft tissue hematoma.   VISUALIZED CHEST: Please see separate report.     CT LUMBAR SPINE:   ALIGNMENT:  No traumatic spondylolisthesis or traumatic facet widening.   VERTEBRAE:  No acute fracture. Vertebral body heights are maintained.   SPINAL CANAL/INTERVERTEBRAL DISCS: No high-grade spinal canal stenosis. Diffuse moderate disc height loss and vacuum phenomenon. There is mild broad-based disc bulging at L2-L3 without significant canal stenosis. There is a broad-based disc spur complex at L3-L4 resulting in mild canal stenosis. There is a broad-based disc bulge at L4-5 resulting in mild canal stenosis. There is a broad-based disc spur complex at L5-S1 not resulting in significant canal stenosis.   NEUROFORAMINA: No high-grade neural foraminal stenosis. There is mild bilateral L5-S1 neural foraminal stenosis. There is mild bilateral L3-L4 and L5-S1 facet arthropathy and moderate bilateral L4-L5 facet arthropathy.   PARASPINAL SOFT TISSUES:  No paravertebral soft tissue hematoma.    VISUALIZED ABDOMEN: Please see separate report.         CT HEAD: 1. No acute intracranial abnormality or calvarial fracture.       CT CERVICAL SPINE: 1. No acute fracture or traumatic malalignment of the cervical spine. 2. Spondylotic changes of the cervical spine as detailed above.   CT THORACIC/LUMBAR SPINE: 1. No acute fracture or traumatic malalignment of the thoracic or lumbar spine. 2. Minimal smooth concave contour to the upper T3-T6 endplates compatible chronic attritional height loss remote compression injuries. 3. No high-grade canal or foraminal stenosis in the thoracic or lumbar spine.   MACRO: None.   Signed by: Mark Matthew 3/24/2025 8:36 PM Dictation workstation:   [id number]      Assessment/Plan    Intoxication  ~difficulty in arousing and brought in by EMS to ER  ~He is alert and oriented this morning.   ~will discharge.     HTN  ~losartan held on admission 2/2 hypotension  ~SBP improved and losartan resumed.      Disposition: Discharge    I spent 25 minutes in the professional and overall care of this patient.      Lilian Sarah, APRN-CNP  Internal Medicine

## 2025-03-25 NOTE — PROGRESS NOTES
Emergency Medicine Transition of Care Note.    I received Mello Mathur Jr. in signout from   Dr. Malick Cacerse please see the previous ED provider note for all HPI, PE and MDM up to the time of signout at 2000. This is in addition to the primary record.    In brief Mello Mathur Jr. is an 69 y.o. male presenting for   Chief Complaint   Patient presents with    Altered Mental Status     At the time of signout we were awaiting: Results of scans and final disposition    ED Course as of 03/24/25 2225   Mon Mar 24, 2025   [birth year]23 EKG interpreted by me.  Normal sinus rhythm, ventricular rate 76, poor R wave progression anteriorly, no acute ST changes, no signs of STEMI. [AG]   [birth year]31 Per wife's history patient and wife are in the process of moving from [place] to [place].  She has not seen the patient much in the past week because she has been in [place].  The patient was prescribed a muscle relaxant for some unspecified neck pain.  He does have a history of hypertension and diabetes.  The patient is not on anticoagulants.  The patient drinks regularly but not in excess.  Per wife [AG]   1934 EKG interpreted by me normal sinus rhythm, normals rate 76.  Axis normal.  No acute ST-T changes no signs of STEMI. [AG]   1933 I will be signing this patient over to the care of Dr. Love [AG]   1939 Discussed with wife at the bedside   BP improved 76/50 [AG]   1940 CBC and Auto Differential(!)  CBC normal white count 7800 hemoglobin 12.5 [AG]   1940 Protime-INR [AG]      ED Course User Index  [AG] Mark Liriano MD         Diagnoses as of 03/24/25 2225   Idiopathic hypotension   Alcoholic intoxication with complication (CMS-HCC)       Medical Decision Making  I received this patient in handoff from Dr. Malick Caceres at change of shift.  Patient was brought in by EMS with lethargy and somnolence and decreased responsiveness.  He was found on the floor.  Only recent history is that of a fall about 5 days ago and is continuing to  have pain in the right dorsolateral spine.  He had no radiographs or workup done related to the fall.  On arrival here his blood pressure was quite low at 76/58.  He now has 2 IVs and his pressures up to 97 systolic.  He is much more awake and talkative.  His speech is clear and demonstrates no confusion.  Patient's workup was positive for a lipase of 238, but liver enzymes were all normal.  Alcohol was still 149 at this time.  He is much more stable awake and alert.  I spoke with our hospitalist, Dr. Guzman, who admitted the patient for hydration and monitoring of the blood pressure.        Final diagnoses:   [I95.0] Idiopathic hypotension   [F10.929] Alcoholic intoxication with complication (CMS-HCC)           Procedure  Procedures    Shayne Love MD

## 2025-03-25 NOTE — CARE PLAN
The patient's goals for the shift include      The clinical goals for the shift include Patient will remain hemodynamically stable throughout the shift    Patient had an uneventful shift. VSS, patient on room air. Patient to be discharged home this shift. Patient went home with [facility] free shuttle.

## 2025-03-26 ENCOUNTER — HOSPITAL ENCOUNTER (OUTPATIENT)
Dept: CARDIOLOGY | Facility: HOSPITAL | Age: 70
Discharge: HOME | End: 2025-03-26
Payer: COMMERCIAL

## 2025-03-26 LAB
ATRIAL RATE: 76 BPM
P AXIS: 28 DEGREES
P OFFSET: 170 MS
P ONSET: 130 MS
PR INTERVAL: 196 MS
Q ONSET: 228 MS
QRS COUNT: 13 BEATS
QRS DURATION: 110 MS
QT INTERVAL: 410 MS
QTC CALCULATION(BAZETT): 461 MS
QTC FREDERICIA: 443 MS
R AXIS: 15 DEGREES
T AXIS: -1 DEGREES
T OFFSET: 433 MS
VENTRICULAR RATE: 76 BPM

## 2025-03-26 PROCEDURE — 93005 ELECTROCARDIOGRAM TRACING: CPT
